# Patient Record
Sex: FEMALE | Race: BLACK OR AFRICAN AMERICAN | NOT HISPANIC OR LATINO | Employment: FULL TIME | URBAN - METROPOLITAN AREA
[De-identification: names, ages, dates, MRNs, and addresses within clinical notes are randomized per-mention and may not be internally consistent; named-entity substitution may affect disease eponyms.]

---

## 2017-01-25 LAB
CFTR MUT ANL BLD/T: NORMAL
EXTERNAL ABO GROUPING: NORMAL
EXTERNAL ANTIBODY SCREEN: NORMAL
EXTERNAL HEMATOCRIT: 28.6 %
EXTERNAL HEMOGLOBIN: 9.2 G/DL
EXTERNAL HEPATITIS B SURFACE ANTIGEN: NORMAL
EXTERNAL HIV-1 ANTIBODY: NORMAL
EXTERNAL RH FACTOR: POSITIVE
EXTERNAL RUBELLA IGG QUANTITATION: NORMAL
EXTERNAL SYPHILIS RPR SCREEN: NORMAL

## 2017-04-05 LAB
EXTERNAL CHLAMYDIA SCREEN: NORMAL
EXTERNAL GONORRHEA SCREEN: NORMAL
EXTERNAL HEMATOCRIT: 23.9 %
EXTERNAL HEMOGLOBIN: 7.8 G/DL
EXTERNAL PLATELET COUNT: 10 K/ΜL

## 2017-06-07 ENCOUNTER — GENERIC CONVERSION - ENCOUNTER (OUTPATIENT)
Dept: OTHER | Facility: OTHER | Age: 27
End: 2017-06-07

## 2017-06-07 ENCOUNTER — HOSPITAL ENCOUNTER (OUTPATIENT)
Facility: HOSPITAL | Age: 27
Discharge: HOME | End: 2017-06-07
Attending: OBSTETRICS & GYNECOLOGY | Admitting: OBSTETRICS & GYNECOLOGY
Payer: COMMERCIAL

## 2017-06-07 VITALS
DIASTOLIC BLOOD PRESSURE: 71 MMHG | RESPIRATION RATE: 18 BRPM | TEMPERATURE: 98 F | HEART RATE: 87 BPM | OXYGEN SATURATION: 100 % | SYSTOLIC BLOOD PRESSURE: 119 MMHG

## 2017-06-07 DIAGNOSIS — Z3A.36 36 WEEKS GESTATION OF PREGNANCY: ICD-10-CM

## 2017-06-07 PROBLEM — O28.1 ABNORMAL BIOCHEMICAL FINDING ON ANTENATAL SCREENING OF MOTHER: Status: ACTIVE | Noted: 2017-04-12

## 2017-06-07 PROBLEM — O09.30 LATE PRENATAL CARE AFFECTING PREGNANCY: Status: ACTIVE | Noted: 2017-02-13

## 2017-06-07 PROCEDURE — 99214 OFFICE O/P EST MOD 30 MIN: CPT

## 2017-06-13 ENCOUNTER — GENERIC CONVERSION - ENCOUNTER (OUTPATIENT)
Dept: OTHER | Facility: OTHER | Age: 27
End: 2017-06-13

## 2017-06-13 DIAGNOSIS — Z34.83 ENCOUNTER FOR SUPERVISION OF OTHER NORMAL PREGNANCY, THIRD TRIMESTER: ICD-10-CM

## 2017-06-13 DIAGNOSIS — O99.013 ANEMIA COMPLICATING PREGNANCY IN THIRD TRIMESTER: ICD-10-CM

## 2017-06-14 ENCOUNTER — APPOINTMENT (OUTPATIENT)
Dept: LAB | Facility: HOSPITAL | Age: 27
End: 2017-06-14
Payer: COMMERCIAL

## 2017-06-14 DIAGNOSIS — O99.013 ANEMIA COMPLICATING PREGNANCY IN THIRD TRIMESTER: ICD-10-CM

## 2017-06-14 DIAGNOSIS — Z34.83 ENCOUNTER FOR SUPERVISION OF OTHER NORMAL PREGNANCY, THIRD TRIMESTER: ICD-10-CM

## 2017-06-14 LAB
CHLAMYDIA DNA CVX QL NAA+PROBE: NORMAL
N GONORRHOEA DNA GENITAL QL NAA+PROBE: NORMAL

## 2017-06-14 PROCEDURE — 87591 N.GONORRHOEAE DNA AMP PROB: CPT

## 2017-06-14 PROCEDURE — 87491 CHLMYD TRACH DNA AMP PROBE: CPT

## 2017-06-14 PROCEDURE — 87653 STREP B DNA AMP PROBE: CPT

## 2017-06-14 PROCEDURE — 36415 COLL VENOUS BLD VENIPUNCTURE: CPT

## 2017-06-16 ENCOUNTER — ANESTHESIA (INPATIENT)
Dept: LABOR AND DELIVERY | Facility: HOSPITAL | Age: 27
DRG: 560 | End: 2017-06-16
Payer: COMMERCIAL

## 2017-06-16 ENCOUNTER — HOSPITAL ENCOUNTER (INPATIENT)
Facility: HOSPITAL | Age: 27
LOS: 2 days | Discharge: HOME | DRG: 560 | End: 2017-06-18
Attending: OBSTETRICS & GYNECOLOGY | Admitting: OBSTETRICS & GYNECOLOGY
Payer: COMMERCIAL

## 2017-06-16 DIAGNOSIS — Z3A.38 38 WEEKS GESTATION OF PREGNANCY: Primary | ICD-10-CM

## 2017-06-16 LAB
ABO GROUP BLD: NORMAL
BASOPHILS # BLD AUTO: 0 THOUSANDS/ΜL (ref 0–0.1)
BASOPHILS NFR BLD AUTO: 0 % (ref 0–1)
BLD GP AB SCN SERPL QL: NEGATIVE
EOSINOPHIL # BLD AUTO: 0.03 THOUSAND/ΜL (ref 0–0.61)
EOSINOPHIL NFR BLD AUTO: 0 % (ref 0–6)
ERYTHROCYTE [DISTWIDTH] IN BLOOD BY AUTOMATED COUNT: 16.2 % (ref 11.6–15.1)
GP B STREP DNA SPEC QL NAA+PROBE: NORMAL
HCT VFR BLD AUTO: 26.4 % (ref 34.8–46.1)
HGB BLD-MCNC: 8.1 G/DL (ref 11.5–15.4)
LYMPHOCYTES # BLD AUTO: 1.46 THOUSANDS/ΜL (ref 0.6–4.47)
LYMPHOCYTES NFR BLD AUTO: 17 % (ref 14–44)
MCH RBC QN AUTO: 25.4 PG (ref 26.8–34.3)
MCHC RBC AUTO-ENTMCNC: 30.7 G/DL (ref 31.4–37.4)
MCV RBC AUTO: 83 FL (ref 82–98)
MONOCYTES # BLD AUTO: 0.52 THOUSAND/ΜL (ref 0.17–1.22)
MONOCYTES NFR BLD AUTO: 6 % (ref 4–12)
NEUTROPHILS # BLD AUTO: 6.8 THOUSANDS/ΜL (ref 1.85–7.62)
NEUTS SEG NFR BLD AUTO: 77 % (ref 43–75)
NRBC BLD AUTO-RTO: 0 /100 WBCS
PLATELET # BLD AUTO: 322 THOUSANDS/UL (ref 149–390)
PMV BLD AUTO: 8.8 FL (ref 8.9–12.7)
RBC # BLD AUTO: 3.19 MILLION/UL (ref 3.81–5.12)
RH BLD: POSITIVE
SPECIMEN EXPIRATION DATE: NORMAL
WBC # BLD AUTO: 8.83 THOUSAND/UL (ref 4.31–10.16)

## 2017-06-16 PROCEDURE — 86850 RBC ANTIBODY SCREEN: CPT | Performed by: OBSTETRICS & GYNECOLOGY

## 2017-06-16 PROCEDURE — 86592 SYPHILIS TEST NON-TREP QUAL: CPT | Performed by: OBSTETRICS & GYNECOLOGY

## 2017-06-16 PROCEDURE — 86901 BLOOD TYPING SEROLOGIC RH(D): CPT | Performed by: OBSTETRICS & GYNECOLOGY

## 2017-06-16 PROCEDURE — 99214 OFFICE O/P EST MOD 30 MIN: CPT

## 2017-06-16 PROCEDURE — 85025 COMPLETE CBC W/AUTO DIFF WBC: CPT | Performed by: OBSTETRICS & GYNECOLOGY

## 2017-06-16 PROCEDURE — 86900 BLOOD TYPING SEROLOGIC ABO: CPT | Performed by: OBSTETRICS & GYNECOLOGY

## 2017-06-16 RX ORDER — SIMETHICONE 80 MG
80 TABLET,CHEWABLE ORAL 4 TIMES DAILY PRN
Status: DISCONTINUED | OUTPATIENT
Start: 2017-06-16 | End: 2017-06-18 | Stop reason: HOSPADM

## 2017-06-16 RX ORDER — IBUPROFEN 600 MG/1
600 TABLET ORAL EVERY 6 HOURS PRN
Status: DISCONTINUED | OUTPATIENT
Start: 2017-06-16 | End: 2017-06-18 | Stop reason: HOSPADM

## 2017-06-16 RX ORDER — DIAPER,BRIEF,INFANT-TODD,DISP
1 EACH MISCELLANEOUS AS NEEDED
Status: DISCONTINUED | OUTPATIENT
Start: 2017-06-16 | End: 2017-06-18 | Stop reason: HOSPADM

## 2017-06-16 RX ORDER — DOCUSATE SODIUM 100 MG/1
100 CAPSULE, LIQUID FILLED ORAL 2 TIMES DAILY
Status: DISCONTINUED | OUTPATIENT
Start: 2017-06-16 | End: 2017-06-18 | Stop reason: HOSPADM

## 2017-06-16 RX ORDER — OXYCODONE HYDROCHLORIDE AND ACETAMINOPHEN 5; 325 MG/1; MG/1
2 TABLET ORAL EVERY 4 HOURS PRN
Status: DISCONTINUED | OUTPATIENT
Start: 2017-06-16 | End: 2017-06-18 | Stop reason: HOSPADM

## 2017-06-16 RX ORDER — ACETAMINOPHEN 325 MG/1
650 TABLET ORAL EVERY 6 HOURS PRN
Status: DISCONTINUED | OUTPATIENT
Start: 2017-06-16 | End: 2017-06-18 | Stop reason: HOSPADM

## 2017-06-16 RX ORDER — OXYTOCIN/RINGER'S LACTATE 30/500 ML
62.5 PLASTIC BAG, INJECTION (ML) INTRAVENOUS CONTINUOUS
Status: DISPENSED | OUTPATIENT
Start: 2017-06-16 | End: 2017-06-16

## 2017-06-16 RX ORDER — BISACODYL 10 MG
10 SUPPOSITORY, RECTAL RECTAL AS NEEDED
Status: DISCONTINUED | OUTPATIENT
Start: 2017-06-16 | End: 2017-06-18 | Stop reason: HOSPADM

## 2017-06-16 RX ORDER — METOCLOPRAMIDE HYDROCHLORIDE 5 MG/ML
10 INJECTION INTRAMUSCULAR; INTRAVENOUS EVERY 6 HOURS PRN
Status: DISCONTINUED | OUTPATIENT
Start: 2017-06-16 | End: 2017-06-18 | Stop reason: HOSPADM

## 2017-06-16 RX ORDER — SODIUM CHLORIDE, SODIUM LACTATE, POTASSIUM CHLORIDE, CALCIUM CHLORIDE 600; 310; 30; 20 MG/100ML; MG/100ML; MG/100ML; MG/100ML
125 INJECTION, SOLUTION INTRAVENOUS CONTINUOUS
Status: DISCONTINUED | OUTPATIENT
Start: 2017-06-16 | End: 2017-06-18 | Stop reason: HOSPADM

## 2017-06-16 RX ORDER — DIPHENHYDRAMINE HCL 25 MG
25 TABLET ORAL EVERY 6 HOURS PRN
Status: DISCONTINUED | OUTPATIENT
Start: 2017-06-16 | End: 2017-06-18 | Stop reason: HOSPADM

## 2017-06-16 RX ORDER — LIDOCAINE HYDROCHLORIDE AND EPINEPHRINE 15; 5 MG/ML; UG/ML
INJECTION, SOLUTION EPIDURAL AS NEEDED
Status: DISCONTINUED | OUTPATIENT
Start: 2017-06-16 | End: 2017-06-16 | Stop reason: SURG

## 2017-06-16 RX ORDER — OXYCODONE HYDROCHLORIDE AND ACETAMINOPHEN 5; 325 MG/1; MG/1
1 TABLET ORAL EVERY 4 HOURS PRN
Status: DISCONTINUED | OUTPATIENT
Start: 2017-06-16 | End: 2017-06-18 | Stop reason: HOSPADM

## 2017-06-16 RX ORDER — ROPIVACAINE HYDROCHLORIDE 2 MG/ML
INJECTION, SOLUTION EPIDURAL; INFILTRATION; PERINEURAL AS NEEDED
Status: DISCONTINUED | OUTPATIENT
Start: 2017-06-16 | End: 2017-06-16 | Stop reason: SURG

## 2017-06-16 RX ORDER — OXYTOCIN/RINGER'S LACTATE 30/500 ML
PLASTIC BAG, INJECTION (ML) INTRAVENOUS
Status: DISPENSED
Start: 2017-06-16 | End: 2017-06-16

## 2017-06-16 RX ADMIN — DOCUSATE SODIUM 100 MG: 100 CAPSULE, LIQUID FILLED ORAL at 17:48

## 2017-06-16 RX ADMIN — SODIUM CHLORIDE, SODIUM LACTATE, POTASSIUM CHLORIDE, AND CALCIUM CHLORIDE 62.5 ML/HR: .6; .31; .03; .02 INJECTION, SOLUTION INTRAVENOUS at 12:28

## 2017-06-16 RX ADMIN — OXYCODONE HYDROCHLORIDE AND ACETAMINOPHEN 2 TABLET: 5; 325 TABLET ORAL at 16:21

## 2017-06-16 RX ADMIN — Medication 62.5 MILLI-UNITS/MIN: at 12:25

## 2017-06-16 RX ADMIN — LIDOCAINE HYDROCHLORIDE AND EPINEPHRINE 5 ML: 15; 5 INJECTION, SOLUTION EPIDURAL at 11:10

## 2017-06-16 RX ADMIN — IBUPROFEN 600 MG: 600 TABLET, FILM COATED ORAL at 13:16

## 2017-06-16 RX ADMIN — ROPIVACAINE HYDROCHLORIDE 7 ML: 2 INJECTION, SOLUTION EPIDURAL; INFILTRATION at 11:10

## 2017-06-16 RX ADMIN — SODIUM CHLORIDE, SODIUM LACTATE, POTASSIUM CHLORIDE, AND CALCIUM CHLORIDE 125 ML/HR: .6; .31; .03; .02 INJECTION, SOLUTION INTRAVENOUS at 10:26

## 2017-06-17 LAB
ANISOCYTOSIS BLD QL SMEAR: PRESENT
BASOPHILS # BLD MANUAL: 0 THOUSAND/UL (ref 0–0.1)
BASOPHILS NFR MAR MANUAL: 0 % (ref 0–1)
BURR CELLS BLD QL SMEAR: PRESENT
EOSINOPHIL # BLD MANUAL: 0 THOUSAND/UL (ref 0–0.4)
EOSINOPHIL NFR BLD MANUAL: 0 % (ref 0–6)
ERYTHROCYTE [DISTWIDTH] IN BLOOD BY AUTOMATED COUNT: 16.5 % (ref 11.6–15.1)
HCT VFR BLD AUTO: 24.3 % (ref 34.8–46.1)
HGB BLD-MCNC: 7.5 G/DL (ref 11.5–15.4)
LYMPHOCYTES # BLD AUTO: 1.27 THOUSAND/UL (ref 0.6–4.47)
LYMPHOCYTES # BLD AUTO: 12 % (ref 14–44)
MCH RBC QN AUTO: 25.8 PG (ref 26.8–34.3)
MCHC RBC AUTO-ENTMCNC: 30.9 G/DL (ref 31.4–37.4)
MCV RBC AUTO: 84 FL (ref 82–98)
METAMYELOCYTES NFR BLD MANUAL: 1 % (ref 0–1)
MONOCYTES # BLD AUTO: 0.42 THOUSAND/UL (ref 0–1.22)
MONOCYTES NFR BLD: 4 % (ref 4–12)
NEUTROPHILS # BLD MANUAL: 8.58 THOUSAND/UL (ref 1.85–7.62)
NEUTS SEG NFR BLD AUTO: 81 % (ref 43–75)
NRBC BLD AUTO-RTO: 1 /100 WBCS
PLATELET # BLD AUTO: 333 THOUSANDS/UL (ref 149–390)
PLATELET BLD QL SMEAR: ADEQUATE
PMV BLD AUTO: 9.3 FL (ref 8.9–12.7)
POIKILOCYTOSIS BLD QL SMEAR: PRESENT
RBC # BLD AUTO: 2.91 MILLION/UL (ref 3.81–5.12)
RBC MORPH BLD: PRESENT
VARIANT LYMPHS # BLD AUTO: 2 %
WBC # BLD AUTO: 10.59 THOUSAND/UL (ref 4.31–10.16)

## 2017-06-17 PROCEDURE — 85007 BL SMEAR W/DIFF WBC COUNT: CPT | Performed by: OBSTETRICS & GYNECOLOGY

## 2017-06-17 PROCEDURE — 85027 COMPLETE CBC AUTOMATED: CPT | Performed by: OBSTETRICS & GYNECOLOGY

## 2017-06-17 RX ORDER — MEDROXYPROGESTERONE ACETATE 150 MG/ML
150 INJECTION, SUSPENSION INTRAMUSCULAR ONCE
Status: COMPLETED | OUTPATIENT
Start: 2017-06-17 | End: 2017-06-18

## 2017-06-17 RX ADMIN — IBUPROFEN 600 MG: 600 TABLET, FILM COATED ORAL at 08:35

## 2017-06-17 RX ADMIN — IBUPROFEN 600 MG: 600 TABLET, FILM COATED ORAL at 01:01

## 2017-06-17 RX ADMIN — OXYCODONE HYDROCHLORIDE AND ACETAMINOPHEN 1 TABLET: 5; 325 TABLET ORAL at 20:42

## 2017-06-17 RX ADMIN — OXYCODONE HYDROCHLORIDE AND ACETAMINOPHEN 1 TABLET: 5; 325 TABLET ORAL at 03:16

## 2017-06-17 RX ADMIN — IBUPROFEN 600 MG: 600 TABLET, FILM COATED ORAL at 15:24

## 2017-06-17 RX ADMIN — DOCUSATE SODIUM 100 MG: 100 CAPSULE, LIQUID FILLED ORAL at 20:42

## 2017-06-17 RX ADMIN — DOCUSATE SODIUM 100 MG: 100 CAPSULE, LIQUID FILLED ORAL at 08:35

## 2017-06-18 VITALS
RESPIRATION RATE: 20 BRPM | HEIGHT: 67 IN | SYSTOLIC BLOOD PRESSURE: 118 MMHG | BODY MASS INDEX: 35.31 KG/M2 | DIASTOLIC BLOOD PRESSURE: 62 MMHG | OXYGEN SATURATION: 98 % | WEIGHT: 225 LBS | HEART RATE: 73 BPM | TEMPERATURE: 97.9 F

## 2017-06-18 RX ADMIN — DOCUSATE SODIUM 100 MG: 100 CAPSULE, LIQUID FILLED ORAL at 08:12

## 2017-06-18 RX ADMIN — MEDROXYPROGESTERONE ACETATE 150 MG: 150 INJECTION, SUSPENSION INTRAMUSCULAR at 11:02

## 2017-06-18 RX ADMIN — OXYCODONE HYDROCHLORIDE AND ACETAMINOPHEN 1 TABLET: 5; 325 TABLET ORAL at 06:27

## 2017-06-19 ENCOUNTER — GENERIC CONVERSION - ENCOUNTER (OUTPATIENT)
Dept: OTHER | Facility: OTHER | Age: 27
End: 2017-06-19

## 2017-06-19 LAB — RPR SER QL: NORMAL

## 2017-06-26 LAB — PLACENTA IN STORAGE: NORMAL

## 2017-07-05 ENCOUNTER — ALLSCRIPTS OFFICE VISIT (OUTPATIENT)
Dept: OTHER | Facility: OTHER | Age: 27
End: 2017-07-05

## 2017-07-11 ENCOUNTER — GENERIC CONVERSION - ENCOUNTER (OUTPATIENT)
Dept: OTHER | Facility: OTHER | Age: 27
End: 2017-07-11

## 2017-08-15 ENCOUNTER — ALLSCRIPTS OFFICE VISIT (OUTPATIENT)
Dept: OTHER | Facility: OTHER | Age: 27
End: 2017-08-15

## 2017-08-15 ENCOUNTER — GENERIC CONVERSION - ENCOUNTER (OUTPATIENT)
Dept: OTHER | Facility: OTHER | Age: 27
End: 2017-08-15

## 2017-08-15 DIAGNOSIS — Z01.818 ENCOUNTER FOR OTHER PREPROCEDURAL EXAMINATION: ICD-10-CM

## 2017-08-30 ENCOUNTER — TRANSCRIBE ORDERS (OUTPATIENT)
Dept: LAB | Facility: CLINIC | Age: 27
End: 2017-08-30

## 2017-08-30 ENCOUNTER — APPOINTMENT (OUTPATIENT)
Dept: LAB | Facility: CLINIC | Age: 27
End: 2017-08-30
Payer: COMMERCIAL

## 2017-08-30 DIAGNOSIS — Z01.818 ENCOUNTER FOR OTHER PREPROCEDURAL EXAMINATION: ICD-10-CM

## 2017-08-30 DIAGNOSIS — Z01.818 PREOP EXAMINATION: Primary | ICD-10-CM

## 2017-08-30 LAB
BACTERIA UR QL AUTO: ABNORMAL /HPF
BASOPHILS # BLD AUTO: 0.01 THOUSANDS/ΜL (ref 0–0.1)
BASOPHILS NFR BLD AUTO: 0 % (ref 0–1)
BILIRUB UR QL STRIP: NEGATIVE
CLARITY UR: CLEAR
COLOR UR: YELLOW
EOSINOPHIL # BLD AUTO: 0.13 THOUSAND/ΜL (ref 0–0.61)
EOSINOPHIL NFR BLD AUTO: 1 % (ref 0–6)
ERYTHROCYTE [DISTWIDTH] IN BLOOD BY AUTOMATED COUNT: 15.9 % (ref 11.6–15.1)
GLUCOSE UR STRIP-MCNC: NEGATIVE MG/DL
HCT VFR BLD AUTO: 31.6 % (ref 34.8–46.1)
HGB BLD-MCNC: 9.3 G/DL (ref 11.5–15.4)
HGB UR QL STRIP.AUTO: ABNORMAL
HYALINE CASTS #/AREA URNS LPF: ABNORMAL /LPF
KETONES UR STRIP-MCNC: NEGATIVE MG/DL
LEUKOCYTE ESTERASE UR QL STRIP: NEGATIVE
LYMPHOCYTES # BLD AUTO: 2.74 THOUSANDS/ΜL (ref 0.6–4.47)
LYMPHOCYTES NFR BLD AUTO: 29 % (ref 14–44)
MCH RBC QN AUTO: 25.1 PG (ref 26.8–34.3)
MCHC RBC AUTO-ENTMCNC: 29.4 G/DL (ref 31.4–37.4)
MCV RBC AUTO: 85 FL (ref 82–98)
MONOCYTES # BLD AUTO: 0.35 THOUSAND/ΜL (ref 0.17–1.22)
MONOCYTES NFR BLD AUTO: 4 % (ref 4–12)
NEUTROPHILS # BLD AUTO: 6.16 THOUSANDS/ΜL (ref 1.85–7.62)
NEUTS SEG NFR BLD AUTO: 66 % (ref 43–75)
NITRITE UR QL STRIP: NEGATIVE
NON-SQ EPI CELLS URNS QL MICRO: ABNORMAL /HPF
NRBC BLD AUTO-RTO: 0 /100 WBCS
PH UR STRIP.AUTO: 7 [PH] (ref 4.5–8)
PLATELET # BLD AUTO: 430 THOUSANDS/UL (ref 149–390)
PMV BLD AUTO: 9 FL (ref 8.9–12.7)
PROT UR STRIP-MCNC: NEGATIVE MG/DL
RBC # BLD AUTO: 3.7 MILLION/UL (ref 3.81–5.12)
RBC #/AREA URNS AUTO: ABNORMAL /HPF
SP GR UR STRIP.AUTO: 1.02 (ref 1–1.03)
UROBILINOGEN UR QL STRIP.AUTO: 0.2 E.U./DL
WBC # BLD AUTO: 9.42 THOUSAND/UL (ref 4.31–10.16)
WBC #/AREA URNS AUTO: ABNORMAL /HPF

## 2017-08-30 PROCEDURE — 85025 COMPLETE CBC W/AUTO DIFF WBC: CPT

## 2017-08-30 PROCEDURE — 81001 URINALYSIS AUTO W/SCOPE: CPT

## 2017-08-30 PROCEDURE — 86900 BLOOD TYPING SEROLOGIC ABO: CPT | Performed by: OBSTETRICS & GYNECOLOGY

## 2017-08-30 PROCEDURE — 36415 COLL VENOUS BLD VENIPUNCTURE: CPT

## 2017-08-30 PROCEDURE — 86850 RBC ANTIBODY SCREEN: CPT | Performed by: OBSTETRICS & GYNECOLOGY

## 2017-08-30 PROCEDURE — 86901 BLOOD TYPING SEROLOGIC RH(D): CPT | Performed by: OBSTETRICS & GYNECOLOGY

## 2017-08-31 ENCOUNTER — LAB REQUISITION (OUTPATIENT)
Dept: LAB | Facility: HOSPITAL | Age: 27
End: 2017-08-31
Payer: COMMERCIAL

## 2017-08-31 DIAGNOSIS — Z01.818 ENCOUNTER FOR OTHER PREPROCEDURAL EXAMINATION: ICD-10-CM

## 2017-09-04 ENCOUNTER — ANESTHESIA EVENT (OUTPATIENT)
Dept: PERIOP | Facility: HOSPITAL | Age: 27
End: 2017-09-04
Payer: COMMERCIAL

## 2017-09-04 LAB
ABO GROUP BLD: NORMAL
BLD GP AB SCN SERPL QL: NEGATIVE
RH BLD: POSITIVE
SPECIMEN EXPIRATION DATE: NORMAL

## 2017-09-05 ENCOUNTER — ANESTHESIA (OUTPATIENT)
Dept: PERIOP | Facility: HOSPITAL | Age: 27
End: 2017-09-05
Payer: COMMERCIAL

## 2017-09-05 ENCOUNTER — HOSPITAL ENCOUNTER (OUTPATIENT)
Facility: HOSPITAL | Age: 27
Setting detail: OUTPATIENT SURGERY
Discharge: HOME | End: 2017-09-05
Attending: OBSTETRICS & GYNECOLOGY | Admitting: OBSTETRICS & GYNECOLOGY
Payer: COMMERCIAL

## 2017-09-05 VITALS
TEMPERATURE: 98.9 F | OXYGEN SATURATION: 99 % | WEIGHT: 220 LBS | SYSTOLIC BLOOD PRESSURE: 117 MMHG | RESPIRATION RATE: 16 BRPM | BODY MASS INDEX: 34.53 KG/M2 | HEART RATE: 70 BPM | HEIGHT: 67 IN | DIASTOLIC BLOOD PRESSURE: 57 MMHG

## 2017-09-05 DIAGNOSIS — Z64.1 PROBLEMS RELATED TO MULTIPARITY: ICD-10-CM

## 2017-09-05 LAB
ABO GROUP BLD: NORMAL
ANISOCYTOSIS BLD QL SMEAR: PRESENT
BASOPHILS # BLD MANUAL: 0 THOUSAND/UL (ref 0–0.1)
BASOPHILS NFR MAR MANUAL: 0 % (ref 0–1)
BLD GP AB SCN SERPL QL: NEGATIVE
BURR CELLS BLD QL SMEAR: PRESENT
EOSINOPHIL # BLD MANUAL: 0.18 THOUSAND/UL (ref 0–0.4)
EOSINOPHIL NFR BLD MANUAL: 2 % (ref 0–6)
ERYTHROCYTE [DISTWIDTH] IN BLOOD BY AUTOMATED COUNT: 15.8 % (ref 11.6–15.1)
EXT PREGNANCY TEST URINE: NEGATIVE
HCT VFR BLD AUTO: 30 % (ref 34.8–46.1)
HGB BLD-MCNC: 9.1 G/DL (ref 11.5–15.4)
LYMPHOCYTES # BLD AUTO: 2.91 THOUSAND/UL (ref 0.6–4.47)
LYMPHOCYTES # BLD AUTO: 33 % (ref 14–44)
MCH RBC QN AUTO: 25.3 PG (ref 26.8–34.3)
MCHC RBC AUTO-ENTMCNC: 30.3 G/DL (ref 31.4–37.4)
MCV RBC AUTO: 84 FL (ref 82–98)
MONOCYTES # BLD AUTO: 0.88 THOUSAND/UL (ref 0–1.22)
MONOCYTES NFR BLD: 10 % (ref 4–12)
NEUTROPHILS # BLD MANUAL: 4.85 THOUSAND/UL (ref 1.85–7.62)
NEUTS SEG NFR BLD AUTO: 55 % (ref 43–75)
NRBC BLD AUTO-RTO: 1 /100 WBCS
PLATELET # BLD AUTO: 397 THOUSANDS/UL (ref 149–390)
PLATELET BLD QL SMEAR: ADEQUATE
PMV BLD AUTO: 8.9 FL (ref 8.9–12.7)
POIKILOCYTOSIS BLD QL SMEAR: PRESENT
RBC # BLD AUTO: 3.59 MILLION/UL (ref 3.81–5.12)
RBC MORPH BLD: PRESENT
RH BLD: POSITIVE
SPECIMEN EXPIRATION DATE: NORMAL
WBC # BLD AUTO: 8.81 THOUSAND/UL (ref 4.31–10.16)
WBC TOXIC VACUOLES BLD QL SMEAR: PRESENT

## 2017-09-05 PROCEDURE — 88302 TISSUE EXAM BY PATHOLOGIST: CPT | Performed by: OBSTETRICS & GYNECOLOGY

## 2017-09-05 PROCEDURE — 81025 URINE PREGNANCY TEST: CPT | Performed by: OBSTETRICS & GYNECOLOGY

## 2017-09-05 PROCEDURE — 85007 BL SMEAR W/DIFF WBC COUNT: CPT | Performed by: OBSTETRICS & GYNECOLOGY

## 2017-09-05 PROCEDURE — 86900 BLOOD TYPING SEROLOGIC ABO: CPT | Performed by: OBSTETRICS & GYNECOLOGY

## 2017-09-05 PROCEDURE — 85027 COMPLETE CBC AUTOMATED: CPT | Performed by: OBSTETRICS & GYNECOLOGY

## 2017-09-05 PROCEDURE — 86850 RBC ANTIBODY SCREEN: CPT | Performed by: OBSTETRICS & GYNECOLOGY

## 2017-09-05 PROCEDURE — 86901 BLOOD TYPING SEROLOGIC RH(D): CPT | Performed by: OBSTETRICS & GYNECOLOGY

## 2017-09-05 RX ORDER — OXYCODONE HYDROCHLORIDE AND ACETAMINOPHEN 5; 325 MG/1; MG/1
1 TABLET ORAL EVERY 4 HOURS PRN
Status: DISCONTINUED | OUTPATIENT
Start: 2017-09-05 | End: 2017-09-05 | Stop reason: HOSPADM

## 2017-09-05 RX ORDER — SODIUM CHLORIDE, SODIUM LACTATE, POTASSIUM CHLORIDE, CALCIUM CHLORIDE 600; 310; 30; 20 MG/100ML; MG/100ML; MG/100ML; MG/100ML
75 INJECTION, SOLUTION INTRAVENOUS CONTINUOUS
Status: DISCONTINUED | OUTPATIENT
Start: 2017-09-05 | End: 2017-09-05 | Stop reason: HOSPADM

## 2017-09-05 RX ORDER — IBUPROFEN 600 MG/1
600 TABLET ORAL EVERY 6 HOURS PRN
Qty: 30 TABLET | Refills: 0 | Status: SHIPPED | OUTPATIENT
Start: 2017-09-05 | End: 2018-01-17

## 2017-09-05 RX ORDER — ROCURONIUM BROMIDE 10 MG/ML
INJECTION, SOLUTION INTRAVENOUS AS NEEDED
Status: DISCONTINUED | OUTPATIENT
Start: 2017-09-05 | End: 2017-09-05 | Stop reason: SURG

## 2017-09-05 RX ORDER — KETOROLAC TROMETHAMINE 30 MG/ML
INJECTION, SOLUTION INTRAMUSCULAR; INTRAVENOUS AS NEEDED
Status: DISCONTINUED | OUTPATIENT
Start: 2017-09-05 | End: 2017-09-05 | Stop reason: SURG

## 2017-09-05 RX ORDER — PROPOFOL 10 MG/ML
INJECTION, EMULSION INTRAVENOUS AS NEEDED
Status: DISCONTINUED | OUTPATIENT
Start: 2017-09-05 | End: 2017-09-05 | Stop reason: SURG

## 2017-09-05 RX ORDER — SODIUM CHLORIDE, SODIUM LACTATE, POTASSIUM CHLORIDE, CALCIUM CHLORIDE 600; 310; 30; 20 MG/100ML; MG/100ML; MG/100ML; MG/100ML
INJECTION, SOLUTION INTRAVENOUS CONTINUOUS PRN
Status: DISCONTINUED | OUTPATIENT
Start: 2017-09-05 | End: 2017-09-05 | Stop reason: SURG

## 2017-09-05 RX ORDER — OXYCODONE HYDROCHLORIDE AND ACETAMINOPHEN 5; 325 MG/1; MG/1
2 TABLET ORAL EVERY 4 HOURS PRN
Status: DISCONTINUED | OUTPATIENT
Start: 2017-09-05 | End: 2017-09-05 | Stop reason: HOSPADM

## 2017-09-05 RX ORDER — PROMETHAZINE HYDROCHLORIDE 25 MG/ML
12.5 INJECTION, SOLUTION INTRAMUSCULAR; INTRAVENOUS ONCE AS NEEDED
Status: DISCONTINUED | OUTPATIENT
Start: 2017-09-05 | End: 2017-09-05 | Stop reason: HOSPADM

## 2017-09-05 RX ORDER — ACETAMINOPHEN 325 MG/1
650 TABLET ORAL EVERY 6 HOURS PRN
COMMUNITY
End: 2018-03-16 | Stop reason: ALTCHOICE

## 2017-09-05 RX ORDER — FENTANYL CITRATE/PF 50 MCG/ML
50 SYRINGE (ML) INJECTION
Status: DISCONTINUED | OUTPATIENT
Start: 2017-09-05 | End: 2017-09-05 | Stop reason: HOSPADM

## 2017-09-05 RX ORDER — ONDANSETRON 2 MG/ML
INJECTION INTRAMUSCULAR; INTRAVENOUS AS NEEDED
Status: DISCONTINUED | OUTPATIENT
Start: 2017-09-05 | End: 2017-09-05 | Stop reason: SURG

## 2017-09-05 RX ORDER — MIDAZOLAM HYDROCHLORIDE 1 MG/ML
INJECTION INTRAMUSCULAR; INTRAVENOUS AS NEEDED
Status: DISCONTINUED | OUTPATIENT
Start: 2017-09-05 | End: 2017-09-05 | Stop reason: SURG

## 2017-09-05 RX ORDER — BUPIVACAINE HYDROCHLORIDE 2.5 MG/ML
INJECTION, SOLUTION INFILTRATION; PERINEURAL AS NEEDED
Status: DISCONTINUED | OUTPATIENT
Start: 2017-09-05 | End: 2017-09-05 | Stop reason: HOSPADM

## 2017-09-05 RX ORDER — ONDANSETRON 2 MG/ML
4 INJECTION INTRAMUSCULAR; INTRAVENOUS EVERY 6 HOURS PRN
Status: DISCONTINUED | OUTPATIENT
Start: 2017-09-05 | End: 2017-09-05 | Stop reason: HOSPADM

## 2017-09-05 RX ORDER — ACETAMINOPHEN 325 MG/1
650 TABLET ORAL EVERY 6 HOURS PRN
Status: DISCONTINUED | OUTPATIENT
Start: 2017-09-05 | End: 2017-09-05 | Stop reason: HOSPADM

## 2017-09-05 RX ORDER — FENTANYL CITRATE 50 UG/ML
INJECTION, SOLUTION INTRAMUSCULAR; INTRAVENOUS AS NEEDED
Status: DISCONTINUED | OUTPATIENT
Start: 2017-09-05 | End: 2017-09-05 | Stop reason: SURG

## 2017-09-05 RX ORDER — IBUPROFEN 400 MG/1
600 TABLET ORAL EVERY 6 HOURS PRN
Status: DISCONTINUED | OUTPATIENT
Start: 2017-09-05 | End: 2017-09-05 | Stop reason: HOSPADM

## 2017-09-05 RX ORDER — GLYCOPYRROLATE 0.2 MG/ML
INJECTION INTRAMUSCULAR; INTRAVENOUS AS NEEDED
Status: DISCONTINUED | OUTPATIENT
Start: 2017-09-05 | End: 2017-09-05 | Stop reason: SURG

## 2017-09-05 RX ADMIN — DEXAMETHASONE SODIUM PHOSPHATE 10 MG: 10 INJECTION INTRAMUSCULAR; INTRAVENOUS at 09:05

## 2017-09-05 RX ADMIN — HYDROMORPHONE HYDROCHLORIDE 0.5 MG: 1 INJECTION, SOLUTION INTRAMUSCULAR; INTRAVENOUS; SUBCUTANEOUS at 10:11

## 2017-09-05 RX ADMIN — MIDAZOLAM HYDROCHLORIDE 2 MG: 1 INJECTION, SOLUTION INTRAMUSCULAR; INTRAVENOUS at 08:35

## 2017-09-05 RX ADMIN — PROPOFOL 200 MG: 10 INJECTION, EMULSION INTRAVENOUS at 08:44

## 2017-09-05 RX ADMIN — HYDROMORPHONE HYDROCHLORIDE 0.5 MG: 1 INJECTION, SOLUTION INTRAMUSCULAR; INTRAVENOUS; SUBCUTANEOUS at 09:00

## 2017-09-05 RX ADMIN — SODIUM CHLORIDE, SODIUM LACTATE, POTASSIUM CHLORIDE, AND CALCIUM CHLORIDE: .6; .31; .03; .02 INJECTION, SOLUTION INTRAVENOUS at 08:00

## 2017-09-05 RX ADMIN — GLYCOPYRROLATE 0.4 MG: 0.2 INJECTION, SOLUTION INTRAMUSCULAR; INTRAVENOUS at 10:01

## 2017-09-05 RX ADMIN — ONDANSETRON 4 MG: 2 INJECTION INTRAMUSCULAR; INTRAVENOUS at 09:49

## 2017-09-05 RX ADMIN — NEOSTIGMINE METHYLSULFATE 3 MG: 1 INJECTION, SOLUTION INTRAMUSCULAR; INTRAVENOUS; SUBCUTANEOUS at 10:01

## 2017-09-05 RX ADMIN — ROCURONIUM BROMIDE 30 MG: 10 INJECTION, SOLUTION INTRAVENOUS at 08:44

## 2017-09-05 RX ADMIN — ROCURONIUM BROMIDE 20 MG: 10 INJECTION, SOLUTION INTRAVENOUS at 09:18

## 2017-09-05 RX ADMIN — KETOROLAC TROMETHAMINE 30 MG: 30 INJECTION, SOLUTION INTRAMUSCULAR at 09:49

## 2017-09-05 RX ADMIN — FENTANYL CITRATE 100 MCG: 50 INJECTION, SOLUTION INTRAMUSCULAR; INTRAVENOUS at 08:53

## 2017-09-05 RX ADMIN — FENTANYL CITRATE 50 MCG: 50 INJECTION INTRAMUSCULAR; INTRAVENOUS at 10:25

## 2017-09-07 ENCOUNTER — GENERIC CONVERSION - ENCOUNTER (OUTPATIENT)
Dept: OTHER | Facility: OTHER | Age: 27
End: 2017-09-07

## 2018-01-09 NOTE — MISCELLANEOUS
Message  KIRILL call placed  Patient reports she is doing well and has no issues with pain or bleeding  Infant is bottle feeding and has appt  with pediatrician set up  Patient will call office to schedule her PP appointment  Active Problems    1  Bacterial vaginosis (616 10,041 9) (N76 0,B96 89)   2  Encounter for contraceptive management (V25 9) (Z30 9)   3  Encounter for IUD removal (V25 12) (Z30 432)   4  Encounter for routine gynecological examination (V72 31) (Z01 419)   5  Missed periods (626 4) (N92 6)   6  Nausea with vomiting (787 01) (R11 2)   7  Screening for STD (sexually transmitted disease) (V74 5) (Z11 3)   8  Trichomonal vulvovaginitis (131 01) (A59 01)   9  Urinary tract infection (599 0) (N39 0)   10  Yeast infection of the vagina (112 1) (B37 3)    Current Meds   1  Metoclopramide HCl - 10 MG Oral Tablet (Reglan); TAKE 1 TABLET EVERY 6 HOURS   AS NEEDED; Therapy: 36IKK7391 to (Evaluate:29Bpy6194)  Requested for: 02CQD2864; Last   Rx:55Hpm0435 Ordered   2  Prenatal One Daily 27-0 8 MG Oral Tablet; TAKE 1 TABLET DAILY AS DIRECTED; Therapy: 03MVI2311 to (Evaluate:81Tgw2713)  Requested for: 74TVI2565; Last   Rx:21Uyt7769 Ordered    Allergies    1   No Known Drug Allergies    Signatures   Electronically signed by : Selin Todd, ; Jun 19 2017  2:26PM EST                       (Author)

## 2018-01-10 NOTE — MISCELLANEOUS
Message  [place]  salpingectomy 9/5/17  Incision clean and dry  No issues with pain control  Follow-up appt  confirmed       Active Problems    1  Bacterial vaginosis (616 10,041 9) (N76 0,B96 89)   2  Consultation for sterilization (V25 09) (Z30 09)   3  Encounter for contraceptive management (V25 9) (Z30 9)   4  Encounter for IUD removal (V25 12) (Z30 432)   5  Encounter for routine gynecological examination (V72 31) (Z01 419)   6  Missed periods (626 4) (N92 6)   7  Postpartum exam (V24 2) (Z39 2)   8  Preoperative testing (V72 84) (Z01 818)   9  Screening for STD (sexually transmitted disease) (V74 5) (Z11 3)   10  Trichomonal vulvovaginitis (131 01) (A59 01)   11  Urinary tract infection (599 0) (N39 0)   12  Yeast infection of the vagina (112 1) (B37 3)    Current Meds   1  No Reported Medications Recorded    Allergies    1   No Known Drug Allergies    Signatures   Electronically signed by : Janett Ortiz RN; Sep  7 2017  2:18PM EST                       (Author)

## 2018-01-11 NOTE — RESULT NOTES
Message   Pt was treated for yeast at appt  Monitor sx for BV since equivocal and wet mt/jacquelyn was neg  for bv  Verified Results  *(Q) SURESWAB(R), VAGINOSIS/VAGINITIS PLUS [id number] 12:00AM Ko Cherry     Test Name Result Flag Reference   CHLAMYDIA TRACHOMATIS$RNA, TMA Not Detected  Not Detected   NEISSERIA [address], TMA Not Detected  Not Detected   This test was performed using the APTIMA COMBO2(R)  Assay (GEN-PROBE(R))  The analytical performance characteristics of this  assay, when used to test SurePath(R) specimens have  been determined by [employer]  BV CATEGORY: EQUIVOCAL A NOT SUPPORTIV   LACTOBACILLUS SPECIES 5 5 Log (cells/mL)     ATOPOBIUM VAGINAE 6 7 Log (cells/mL)     MEGASPHAERA SPECIES 6 8 Log (cells/mL)     GARDNERELLA VAGINALIS >8 0 Log (cells/mL)     NOT SUPPORTIVE OF BV: The pattern of results is not  supportive of a diagnosis of BV: 1)Presence of  Lactobacillus spp , G  vaginalis levels less than 6 0  log cells/mL, and absence of A  vaginae and Megasphaera  spp; or 2)Absence of all targeted organisms; or  3)Absence of Lactobacillus spp   plus G  vaginalis  detected at levels less than 6 0 log cells/mL and  absence of A  vaginae and Megasphaera spp  EQUIVOCAL FOR BV: The pattern of results is neither  supportive nor not supportive of a diagnosis of BV  The patient may be in transition into or out of BV:  Presence of Lactobacillus spp  plus G  vaginalis  (greater or equal to 6 0 log cells/mL) and/or one of  the other BV-associated pathogens  SUPPORTIVE OF BV: The pattern of results is supportive  of a diagnosis of BV: Absence of Lactobacillus spp  and  presence of G  vaginalis greater than or equal to 6 0  log cells/mL and/or one or both of the other  BV-associated pathogens       Concentration for Lactobacilli (L  acidophilus/  crispatus, L  jensenii) are collectively reported under  the term "Lactobacillus spp ", as these species are  among the peroxide producing Lactobacilli thought to  be protective against bacterial vaginosis  Atopobium  vaginae, Megasphaera spp , and Gardnerella (greater  than 6 0 log cells/mL) have been associated with  vaginosis when present in the absence of peroxide  producing Lactobacilli  This test was developed and its analytical  performance characteristics have been determined  by [employer], [place]  It has not been cleared or approved by the FDA  This  assay has been validated pursuant to the CLIA  regulations and is used for clinical purposes  TRICHOMONAS VAGINALIS RNA$QUALITATIVE TMA Not Detected  Not Detected   This test was performed using the APTIMA Trichomonas  vaginalis Assay ([employer])  For more information on this test, go to  http://Stylistpick/faq/  Trichomonastma   C  ALBICANS, DNA Detected A Not Detected   C  GLABRATA, DNA Not Detected  Not Detected   C  TROPICALIS, DNA Not Detected  Not Detected   C  PARAPSILOSIS, DNA Not Detected  Not Detected   This test was developed and its analytical  performance characteristics have been determined  by [facility], [place]  It has not been cleared or approved by the FDA  This  assay has been validated pursuant to the CLIA  regulations and is used for clinical purposes

## 2018-01-11 NOTE — MISCELLANEOUS
Message  Jeniffer Umaña is not our pt, she is a pnat 37 wks, she was seen in [place] [address] er 6/5/17 & wanted to have a fu er visit & transfer to our off as a NEWPT  her ins is [facility] which we do not Par with  I mentioned to Mark Odonnell she should have a 2 day fu with her current obgyn & then gave her the [facility] # to call  Active Problems    1  11 weeks gestation of pregnancy (V22 2) (Z3A 11)   2  Bacterial vaginosis (616 10,041 9) (N76 0,B96 89)   3  Encounter for contraceptive management (V25 9) (Z30 9)   4  Encounter for IUD removal (V25 12) (Z30 432)   5  Encounter for routine gynecological examination (V72 31) (Z01 419)   6  Missed periods (626 4) (N92 6)   7  Multigravida in first trimester (V22 1) (Z34 81)   8  Nausea with vomiting (787 01) (R11 2)   9  Positive urine pregnancy test (V72 42) (Z32 01)   10  Screening for STD (sexually transmitted disease) (V74 5) (Z11 3)   11  Trichomonal vulvovaginitis (131 01) (A59 01)   12  Urinary tract infection (599 0) (N39 0)   13  Vaginal discharge (623 5) (N89 8)   14  Yeast infection of the vagina (112 1) (B37 3)    Current Meds   1  Metoclopramide HCl - 10 MG Oral Tablet; TAKE 1 TABLET EVERY 6 HOURS AS   NEEDED; Therapy: 91LQS6562 to (Evaluate:41Npw7176)  Requested for: 82QKA7453; Last   Rx:30Nov2016 Ordered   2  Prenatal One Daily 27-0 8 MG Oral Tablet; TAKE 1 TABLET DAILY AS DIRECTED; Therapy: 45ELR2569 to (Evaluate:39Mkc4417)  Requested for: 05YWY9552; Last   Rx:30Nov2016 Ordered    Allergies    1   No Known Drug Allergies    Signatures   Electronically signed by : Sirena Maravilla, ; Jun 8 2017  7:57AM EST                       (Author)

## 2018-01-12 VITALS
SYSTOLIC BLOOD PRESSURE: 126 MMHG | BODY MASS INDEX: 36.26 KG/M2 | DIASTOLIC BLOOD PRESSURE: 61 MMHG | WEIGHT: 231 LBS | HEIGHT: 67 IN | HEART RATE: 92 BPM

## 2018-01-13 NOTE — RESULT NOTES
Message  Trichomonas on sureswab, pt was treated for BV with Metrogel which will not be sufficient, will Rx Metronidazole 2 gms once  Lissett Bean to call pt today and inform  Plan  Trichomonal vulvovaginitis    · MetroNIDAZOLE 500 MG Oral Tablet; take 2gms once, use 500mg tablet    Signatures   Electronically signed by : JARRETT Lozada;  Apr 4 2016 10:57AM EST                       (Author)

## 2018-01-14 NOTE — MISCELLANEOUS
Message  spoke with patient regarding trichomonas  Patient instructed to  prescription at [facility] and take as ordered-- and to avoid alcohol  Patient verbalizes understanding  Active Problems    1  Bacterial vaginosis (616 10,041 9) (N76 0,B96 89)   2  Encounter for contraceptive management (V25 9) (Z30 9)   3  Encounter for routine gynecological examination (V72 31) (Z01 419)   4  Screening for STD (sexually transmitted disease) (V74 5) (Z11 3)   5  Trichomonal vulvovaginitis (131 01) (A59 01)   6  Urinary tract infection (599 0) (N39 0)    Current Meds   1  Low-Ogestrel 0 3-30 MG-MCG Oral Tablet; take 1 tablet by mouth once daily; Therapy: 92SKV1358 to (Last Rx:29Mar2016)  Requested for: 29Mar2016 Ordered   2  MetroNIDAZOLE 0 75 % Vaginal Gel; insert one applicatorful per vagina at HS x 5; Therapy: 07USR9556 to (Rakan Sharps)  Requested for: 94CEV8620; Last   Rx:29Mar2016 Ordered   3  MetroNIDAZOLE 500 MG Oral Tablet; take 2gms once, use 500mg tablet; Therapy: 39Byl0400 to (Sharath Stokes)  Requested for: 04Apr2016; Last   Rx:04Apr2016 Ordered    Allergies    1   No Known Drug Allergies    Signatures   Electronically signed by : Can Carr, ; Apr 4 2016 11:03AM EST                       (Author)

## 2018-01-16 NOTE — MISCELLANEOUS
Provider Comments  Provider Comments:   PT NO SHOW FOR HER PP APPT        Signatures   Electronically signed by : Sp Garcia, ; Jul 5 2017  2:57PM EST                       (Author)

## 2018-01-17 ENCOUNTER — HOSPITAL ENCOUNTER (EMERGENCY)
Facility: HOSPITAL | Age: 28
Discharge: HOME | End: 2018-01-17
Admitting: EMERGENCY MEDICINE
Payer: COMMERCIAL

## 2018-01-17 VITALS
WEIGHT: 210 LBS | OXYGEN SATURATION: 99 % | HEART RATE: 81 BPM | HEIGHT: 67 IN | BODY MASS INDEX: 32.96 KG/M2 | SYSTOLIC BLOOD PRESSURE: 155 MMHG | RESPIRATION RATE: 18 BRPM | DIASTOLIC BLOOD PRESSURE: 71 MMHG | TEMPERATURE: 98.4 F

## 2018-01-17 DIAGNOSIS — K08.89 PAIN, DENTAL: Primary | ICD-10-CM

## 2018-01-17 PROCEDURE — 99282 EMERGENCY DEPT VISIT SF MDM: CPT

## 2018-01-17 RX ORDER — NAPROXEN 500 MG/1
500 TABLET ORAL 2 TIMES DAILY WITH MEALS
Qty: 14 TABLET | Refills: 0 | Status: SHIPPED | OUTPATIENT
Start: 2018-01-17 | End: 2018-03-16 | Stop reason: ALTCHOICE

## 2018-01-17 RX ORDER — PENICILLIN V POTASSIUM 500 MG/1
500 TABLET ORAL 4 TIMES DAILY
Qty: 40 TABLET | Refills: 0 | Status: SHIPPED | OUTPATIENT
Start: 2018-01-17 | End: 2018-01-27

## 2018-01-17 NOTE — DISCHARGE INSTRUCTIONS
Toothache   WHAT YOU NEED TO KNOW:   A toothache is pain that is caused by irritation of the nerves in the center of your tooth  The irritation may be caused by several problems, such as a cavity, an infection, a cracked tooth, or gum disease  It is very important to follow up with your dentist so the cause of your toothache can be diagnosed and treated  This can help prevent more serious problems  DISCHARGE INSTRUCTIONS:   Medicines: You may  need any of the following:  · NSAIDs  decrease swelling and pain  This medicine can be bought with or without a doctor's order  This medicine can cause stomach bleeding or kidney problems in certain people  If you take blood thinner medicine, always ask your healthcare provider if NSAIDs are safe for you  Always read the medicine label and follow the directions on it before using this medicine  · Acetaminophen  decreases pain  It is available without a doctor's order  Ask how much to take and how often to take it  Follow directions  Acetaminophen can cause liver damage if not taken correctly  · Pain medicine  may be given as a pill or as medicine that you put directly on your tooth or gums  Do not wait until the pain is severe before you take this medicine  · Antibiotics  help fight or prevent an infection caused by bacteria  Take them as directed  · Take your medicine as directed  Contact your healthcare provider if you think your medicine is not helping or if you have side effects  Tell him of her if you are allergic to any medicine  Keep a list of the medicines, vitamins, and herbs you take  Include the amounts, and when and why you take them  Bring the list or the pill bottles to follow-up visits  Carry your medicine list with you in case of an emergency  Follow up with your dentist as directed: You may be referred to a dental surgeon  Write down your questions so you remember to ask them during your visits     Self-care:   · Rinse your mouth with warm salt water 4 times a day or as directed  · You may need to eat soft foods to help relieve pain caused by chewing  Contact your dentist if:   · You have questions or concerns about your condition or care  Return to the emergency department if:   · You have trouble breathing  · You have swelling in your face or neck  · You have a fever and chills  · You have trouble speaking or swallowing  · You have trouble opening or closing your mouth  © 2017 [facility] Information is for End User's use only and may not be sold, redistributed or otherwise used for commercial purposes  All illustrations and images included in CareNotes® are the copyrighted property of A D A M , Inc  or Ajay Gaitan  The above information is an  only  It is not intended as medical advice for individual conditions or treatments  Talk to your doctor, nurse or pharmacist before following any medical regimen to see if it is safe and effective for you

## 2018-01-17 NOTE — ED PROVIDER NOTES
History  Chief Complaint   Patient presents with    Dental Pain     patient has tooth pain since yesterday, does not have an appointment with dentist for 2 months       History provided by:  Patient  Dental Pain   Location:  Upper  Upper teeth location:  13/LU 2nd bicuspid  Quality:  Aching  Severity:  Moderate  Onset quality:  Gradual  Duration:  2 days  Timing:  Constant  Progression:  Worsening  Chronicity:  New  Context: dental fracture    Context comment:  Broken for a long time however recently broke more  Relieved by:  NSAIDs  Worsened by:  Cold food/drink and hot food/drink  Associated symptoms: oral bleeding    Associated symptoms: no difficulty swallowing, no drooling, no facial pain, no facial swelling, no fever, no gum swelling and no trismus    Risk factors: lack of dental care        Prior to Admission Medications   Prescriptions Last Dose Informant Patient Reported? Taking?   acetaminophen (TYLENOL) 325 mg tablet 1/17/2018 at Unknown time  Yes Yes   Sig: Take 650 mg by mouth every 6 (six) hours as needed for mild pain   ibuprofen (MOTRIN) 600 mg tablet 1/17/2018 at Unknown time  No Yes   Sig: Take 1 tablet by mouth every 6 (six) hours as needed for moderate pain      Facility-Administered Medications: None       Past Medical History:   Diagnosis Date    Varicella     had Vaccine       Past Surgical History:   Procedure Laterality Date    MT LAP,RMV  ADNEXAL STRUCTURE Bilateral 9/5/2017    Procedure: Sandra Stacks;  Surgeon: Aggie Cohen MD;  [facility]: [facility];  Service: Gynecology       Family History   Problem Relation Age of Onset    Hypertension Mother     Diabetes Father      I have reviewed and agree with the history as documented      Social History   Substance Use Topics    Smoking status: Current Some Day Smoker     Packs/day: 0 20     Last attempt to quit: 11/16/2016    Smokeless tobacco: Never Used    Alcohol use No        Review of Systems   Constitutional: Negative for chills and fever  HENT: Positive for dental problem  Negative for drooling, facial swelling and sore throat  Respiratory: Negative for shortness of breath  Gastrointestinal: Negative for nausea and vomiting  Skin: Negative for rash and wound  Physical Exam  ED Triage Vitals [01/17/18 1214]   Temperature Pulse Respirations Blood Pressure SpO2   98 4 °F (36 9 °C) 81 18 155/71 99 %      Temp Source Heart Rate Source Patient Position - Orthostatic VS BP Location FiO2 (%)   Oral Monitor Sitting Left arm --      Pain Score       Worst Possible Pain           Orthostatic Vital Signs  Vitals:    01/17/18 1214   BP: 155/71   Pulse: 81   Patient Position - Orthostatic VS: Sitting       Physical Exam   Constitutional: She appears well-developed and well-nourished  HENT:   Mouth/Throat: Oropharynx is clear and moist  No trismus in the jaw  No dental abscesses or uvula swelling  Eyes: Conjunctivae are normal    Neck: Neck supple  Cardiovascular: Normal rate, regular rhythm and normal heart sounds  Pulmonary/Chest: Effort normal and breath sounds normal    Lymphadenopathy:     She has no cervical adenopathy  Skin: Skin is warm and dry  Capillary refill takes less than 2 seconds  Psychiatric: She has a normal mood and affect  Nursing note and vitals reviewed        ED Medications  Medications - No data to display    Diagnostic Studies  Results Reviewed     None                 No orders to display              Procedures  Procedures       Phone Contacts  ED Phone Contact    ED Course  ED Course                                MDM  CritCare Time    Disposition  Final diagnoses:   Pain, dental     Time reflects when diagnosis was documented in both MDM as applicable and the Disposition within this note     Time User Action Codes Description Comment    1/17/2018 12:42 PM Makenna Diego Add [K08 89] Pain, dental       ED Disposition     ED Disposition Condition Comment    Discharge Dina Villalobos discharge to home/self care  Condition at discharge: Good        Follow-up Information     Follow up With Specialties Details Why Contact Info Additional [address]  In 3 days  [address] Emergency Department Emergency Medicine  If symptoms worsen [address]  [phone]  ED, [address] [place] [address], [address]        Patient's Medications   Discharge Prescriptions    NAPROXEN (EC NAPROSYN) 500 MG EC TABLET    Take 1 tablet by mouth 2 (two) times a day with meals for 7 days       Start Date: 1/17/2018 End Date: 1/24/2018       Order Dose: 500 mg       Quantity: 14 tablet    Refills: 0    PENICILLIN V POTASSIUM (VEETID) 500 MG TABLET    Take 1 tablet by mouth 4 (four) times a day for 10 days       Start Date: 1/17/2018 End Date: 1/27/2018       Order Dose: 500 mg       Quantity: 40 tablet    Refills: 0     No discharge procedures on file      ED Provider  Electronically Signed by           Rafael Hilton PA-C  01/17/18 3876

## 2018-01-22 VITALS
HEART RATE: 89 BPM | WEIGHT: 220 LBS | BODY MASS INDEX: 34.53 KG/M2 | SYSTOLIC BLOOD PRESSURE: 115 MMHG | DIASTOLIC BLOOD PRESSURE: 60 MMHG | HEIGHT: 67 IN

## 2018-01-22 VITALS
DIASTOLIC BLOOD PRESSURE: 74 MMHG | WEIGHT: 225.25 LBS | HEIGHT: 67 IN | SYSTOLIC BLOOD PRESSURE: 121 MMHG | BODY MASS INDEX: 35.35 KG/M2

## 2018-03-16 ENCOUNTER — HOSPITAL ENCOUNTER (EMERGENCY)
Facility: HOSPITAL | Age: 28
Discharge: HOME | End: 2018-03-16
Attending: EMERGENCY MEDICINE
Payer: COMMERCIAL

## 2018-03-16 VITALS
TEMPERATURE: 98.2 F | SYSTOLIC BLOOD PRESSURE: 120 MMHG | WEIGHT: 247.5 LBS | BODY MASS INDEX: 38.76 KG/M2 | DIASTOLIC BLOOD PRESSURE: 60 MMHG | RESPIRATION RATE: 16 BRPM | HEART RATE: 97 BPM | OXYGEN SATURATION: 100 %

## 2018-03-16 DIAGNOSIS — Z20.7 EXPOSURE TO HEAD LICE: Primary | ICD-10-CM

## 2018-03-16 PROCEDURE — 99282 EMERGENCY DEPT VISIT SF MDM: CPT

## 2018-03-16 NOTE — ED PROVIDER NOTES
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  Patient currently is not having symptoms  Patient states she does have significant dandruff which is normal for her  On exam however there is no nits or lice witnessed  I offered patient permethrin if she is worried about it, however I do not believe she currently has lice  None       Past Medical History:   Diagnosis Date    Varicella     had Vaccine       Past Surgical History:   Procedure Laterality Date    NC LAP,RMV  ADNEXAL STRUCTURE Bilateral 9/5/2017    Procedure: Kay Cardozo;  Surgeon: Leonor River MD;  Location: [facility];  Service: Gynecology       Family History   Problem Relation Age of Onset    Hypertension Mother     Diabetes Father      I have reviewed and agree with the history as documented  Social History   Substance Use Topics    Smoking status: Current Some Day Smoker     Packs/day: 0 20     Types: Cigarettes     Last attempt to quit: 11/16/2016    Smokeless tobacco: Never Used    Alcohol use No        Review of Systems   Constitutional: Negative for activity change, chills, fatigue and fever  HENT: Negative for congestion, ear pain, mouth sores, sore throat and trouble swallowing  Eyes: Negative for photophobia and visual disturbance  Respiratory: Negative for chest tightness, shortness of breath and wheezing  Cardiovascular: Negative for chest pain and palpitations  Gastrointestinal: Negative for abdominal pain, constipation, diarrhea, nausea and vomiting  Genitourinary: Negative for decreased urine volume, difficulty urinating, dysuria, genital sores and hematuria  Musculoskeletal: Negative for arthralgias, myalgias, neck pain and neck stiffness  Skin: Negative for rash and wound  Neurological: Negative for dizziness, syncope, weakness, light-headedness, numbness and headaches  Hematological: Negative for adenopathy  All other systems reviewed and are negative  Physical Exam  ED Triage Vitals [03/16/18 1352]   Temperature Pulse Respirations Blood Pressure SpO2   98 2 °F (36 8 °C) 97 16 120/60 100 %      Temp Source Heart Rate Source Patient Position - Orthostatic VS BP Location FiO2 (%)   Oral -- Sitting Right arm --      Pain Score       No Pain           Orthostatic Vital Signs  Vitals:    03/16/18 1352   BP: 120/60   Pulse: 97   Patient Position - Orthostatic VS: Sitting       Physical Exam   Constitutional: She is oriented to person, place, and time  She appears well-developed and well-nourished  No distress  HENT:   Head: Normocephalic and atraumatic  Right Ear: External ear normal    Left Ear: External ear normal    Nose: Nose normal    Mouth/Throat: Oropharynx is clear and moist    Marked dandruff, no lice seen   Eyes: Conjunctivae and EOM are normal  Pupils are equal, round, and reactive to light  No scleral icterus  Neck: Normal range of motion  Neck supple  Cardiovascular: Normal rate, regular rhythm, normal heart sounds and intact distal pulses  Exam reveals no gallop and no friction rub  No murmur heard  Pulmonary/Chest: Effort normal and breath sounds normal  No respiratory distress  She has no wheezes  Abdominal: Soft  She exhibits no distension  There is no tenderness  There is no guarding  Musculoskeletal: Normal range of motion   She exhibits no edema, tenderness or deformity  Lymphadenopathy:     She has no cervical adenopathy  Neurological: She is alert and oriented to person, place, and time  Skin: Skin is warm and dry  Capillary refill takes less than 2 seconds  She is not diaphoretic  Nursing note and vitals reviewed  ED Medications  Medications - No data to display    Diagnostic Studies  Results Reviewed     None                 No orders to display              Procedures  Procedures       Phone Contacts  ED Phone Contact    ED Course  ED Course                                MDM  Number of Diagnoses or Management Options  Exposure to head lice: new and does not require workup  Risk of Complications, Morbidity, and/or Mortality  Presenting problems: low  Diagnostic procedures: minimal  Management options: low    Patient Progress  Patient progress: stable    CritCare Time    Disposition  Final diagnoses:   Exposure to head lice     Time reflects when diagnosis was documented in both MDM as applicable and the Disposition within this note     Time User Action Codes Description Comment    3/16/2018  3:34 PM Vinnie Wallace Conception Add [Z20 7] Exposure to head lice       ED Disposition     ED Disposition Condition Comment    Discharge  Cande Torres discharge to home/self care      Condition at discharge: Stable        Follow-up Information     Follow up With Specialties Details Why Contact Info Additional Information    Fransico Harman MD Family Medicine Schedule an appointment as soon as possible for a visit As needed [address] [phone]       [facility] Emergency Department Emergency Medicine  If symptoms worsen [address] [facility] ED, [address]  , [place], [place], [address]        Discharge Medication List as of 3/16/2018  3:36 PM      START taking these medications    Details   permethrin (NIX) 1 % lotion Apply topically once for 1 dose Shampoo, rinse and towel dry hair, saturate hair and scalp with permethrin  Rinse after 10 min; repeat in 1 week if needed, Starting Fri 3/16/2018, Print           No discharge procedures on file      ED Provider  Electronically Signed by           Yany Santiago PA-C  03/16/18 1600

## 2019-08-26 ENCOUNTER — OFFICE VISIT (OUTPATIENT)
Dept: OBGYN CLINIC | Facility: CLINIC | Age: 29
End: 2019-08-26

## 2019-08-26 VITALS
BODY MASS INDEX: 36.79 KG/M2 | DIASTOLIC BLOOD PRESSURE: 81 MMHG | WEIGHT: 234.4 LBS | SYSTOLIC BLOOD PRESSURE: 128 MMHG | HEART RATE: 92 BPM | HEIGHT: 67 IN

## 2019-08-26 DIAGNOSIS — Z01.419 WOMEN'S ANNUAL ROUTINE GYNECOLOGICAL EXAMINATION: Primary | ICD-10-CM

## 2019-08-26 PROBLEM — O09.30 LATE PRENATAL CARE AFFECTING PREGNANCY: Status: RESOLVED | Noted: 2017-02-13 | Resolved: 2019-08-26

## 2019-08-26 PROBLEM — Z3A.38 38 WEEKS GESTATION OF PREGNANCY: Status: RESOLVED | Noted: 2017-06-16 | Resolved: 2019-08-26

## 2019-08-26 PROBLEM — O28.1 ABNORMAL BIOCHEMICAL FINDING ON ANTENATAL SCREENING OF MOTHER: Status: RESOLVED | Noted: 2017-04-12 | Resolved: 2019-08-26

## 2019-08-26 PROCEDURE — G0145 SCR C/V CYTO,THINLAYER,RESCR: HCPCS | Performed by: NURSE PRACTITIONER

## 2019-08-26 PROCEDURE — 87624 HPV HI-RISK TYP POOLED RSLT: CPT | Performed by: NURSE PRACTITIONER

## 2019-08-26 PROCEDURE — 3725F SCREEN DEPRESSION PERFORMED: CPT | Performed by: NURSE PRACTITIONER

## 2019-08-26 PROCEDURE — 99395 PREV VISIT EST AGE 18-39: CPT | Performed by: NURSE PRACTITIONER

## 2019-08-26 NOTE — PATIENT INSTRUCTIONS
Human Papillomavirus Vaccine (By injection)   Human Papillomavirus Vaccine (HUE-man pap-ah-CHITO-viraj-VYE-daniele VAX-een)  Helps prevent genital warts and cancer of the anus, cervix, vagina, or vulva, which may be caused by human papillomavirus (HPV)  Brand Name(s): Cervarix, Gardasil, Gardasil 9   There may be other brand names for this medicine  When This Medicine Should Not Be Used: This vaccine is not right for everyone  You should not receive it if you had an allergic reaction to human papillomavirus vaccine or to yeast   How to Use This Medicine:   Injectable  · Your doctor will prescribe your exact dose and tell you how often it should be given  This medicine is given as a shot into one of your muscles  · A nurse or other health provider will give you this medicine  · This vaccine must be given as 2 or 3 doses based on the brand  · Read and follow the patient instructions that come with this medicine  Talk to your doctor or pharmacist if you have any questions  · Missed dose: This vaccine needs to be given on a fixed schedule  If you miss your scheduled shot, call your doctor to make another appointment as soon as possible  Drugs and Foods to Avoid:   Ask your doctor or pharmacist before using any other medicine, including over-the-counter medicines, vitamins, and herbal products  · Some medicines can affect how this vaccine works  Tell your doctor if you are using any treatment that weakens the immune system, such as cancer medicine, radiation treatment, or a steroid  Warnings While Using This Medicine:   · Tell your doctor if you are pregnant or breastfeeding, or if you have a weak immune system or are allergic to latex  · This vaccine will not protect you against sexually transmitted diseases that are not caused by HPV  · You still need to see your doctor for routine screening tests for anal or cervical cancer even after you receive this vaccine    · You may feel faint, lightheaded, or dizzy right after you receive this vaccine  Your doctor may ask you to wait 15 minutes before standing  Possible Side Effects While Using This Medicine:   Call your doctor right away if you notice any of these side effects:  · Allergic reaction: Itching or hives, swelling in your face or hands, swelling or tingling in your mouth or throat, chest tightness, trouble breathing  · Lightheadedness, dizziness, or fainting  If you notice these less serious side effects, talk with your doctor:   · Headache, tiredness  · Mild fever  · Pain, redness, itching, or swelling where the shot is given  If you notice other side effects that you think are caused by this medicine, tell your doctor  Call your doctor for medical advice about side effects  You may report side effects to FDA at 0-485-PAI-8432  © 2017 [address] Larry [employer] Information is for End User's use only and may not be sold, redistributed or otherwise used for commercial purposes  The above information is an  only  It is not intended as medical advice for individual conditions or treatments  Talk to your doctor, nurse or pharmacist before following any medical regimen to see if it is safe and effective for you  Breast Self Exam for Women   WHAT YOU NEED TO KNOW:   What is a breast self-exam (BSE)? A BSE is a way to check your breasts for lumps and other changes  Regular BSEs can help you know how your breasts normally look and feel  Most breast lumps or changes are not cancer, but you should always have them checked by a healthcare provider  Your healthcare provider can also watch you do a BSE and can tell you if you are doing your BSE correctly  Why should I do a BSE? Breast cancer is the most common type of cancer in women  Even if you have mammograms, you may still want to do a BSE regularly  If you know how your breasts normally feel and look, it may help you know when to contact your healthcare provider  Mammograms can miss some cancers   You may find a lump during a BSE that did not show up on your mammogram   When should I do a BSE? Philip your calendar to help you remember to do BSE on a regular schedule  One easy way to remember to do a BSE is to do the exam on the same day of each month  If you have periods, you may want to do your BSE 1 week after your period ends  This is the time when your breasts may be the least swollen, lumpy, or tender  You can do regular BSEs even if you are breastfeeding or have breast implants  How should I do a BSE? · Look at your breasts in a mirror  Look at the size and shape of each breast and nipple  Check for swelling, lumps, dimpling, scaly skin, or other skin changes  Look for nipple changes, such as a nipple that is painful or beginning to pull inward  Gently squeeze both nipples and check to see if fluid (that is not breast milk) comes out of them  If you find any of these or other breast changes, contact your healthcare provider  Check your breasts while you sit or  the following 3 positions:    [facility] your arms down at your sides  ¨ Raise your hands and join them behind your head  ¨ Put firm pressure with your hands on your hips  Bend slightly forward while you look at your breasts in the mirror  · Lie down and feel your breasts  When you lie down, your breast tissue spreads out evenly over your chest  This makes it easier for you to feel for lumps and anything that may not be normal for your breasts  Do a BSE on one breast at a time  ¨ Place a small pillow or towel under your left shoulder  Put your left arm behind your head  ¨ Use the 3 middle fingers of your right hand  Use your fingertip pads, on the top of your fingers  Your fingertip pad is the most sensitive part of your finger  ¨ Use small circles to feel your breast tissue  Use your fingertip pads to make dime-sized, overlapping circles on your breast and armpits  Use light, medium, and firm pressure   First, press lightly  Second, press with medium pressure to feel a little deeper into the breast  Last, use firm pressure to feel deep within your breast     ¨ Examine your entire breast area  Examine the breast area from above the breast to below the breast where you feel only ribs  Make small circles with your fingertips, starting in the middle of your armpit  Make circles going up and down the breast area  Continue toward your breast and all the way across it  Examine the area from your armpit all the way over to the middle of your chest (breastbone)  Stop at the middle of your chest     ¨ Move the pillow or towel to your right shoulder, and put your right arm behind your head  Use the 3 fingertip pads of your left hand, and repeat the above steps to do a BSE on your right breast        What else can I do to check for breast problems or cancer? Some experts suggest that women 36years of age or older should have a mammogram every year  Other experts suggest that women between the ages of 48and 76years old should have a mammogram every 2 years  Talk to your healthcare provider about when you should have a mammogram   When should I contact my healthcare provider? · You find any lumps or changes in your breasts  · You have breast pain or fluid coming from your nipples  · You have questions or concerns or concerns about your condition or care  CARE AGREEMENT:   You have the right to help plan your care  Learn about your health condition and how it may be treated  Discuss treatment options with your caregivers to decide what care you want to receive  You always have the right to refuse treatment  The above information is an  only  It is not intended as medical advice for individual conditions or treatments  Talk to your doctor, nurse or pharmacist before following any medical regimen to see if it is safe and effective for you    © 2017 [employer] Larry Morales Information is for End User's use only and may not be sold, redistributed or otherwise used for commercial purposes  All illustrations and images included in CareNotes® are the copyrighted property of A D A M , [employer]  or Ajay Gaitan  Pap Smear   WHAT YOU NEED TO KNOW:   What do I need to know about a Pap smear? A Pap smear, or Pap test, is used to screen for cervical cancer  It is also used to find precancerous and cancerous cells of the vulva and vagina  How do I prepare for a Pap smear? The best time to schedule the test is right after your period stops  Do not have a Pap smear during your monthly period  What will happen during a Pap smear? · You will lie on your back and place your feet on footrests called stirrups  Your healthcare provider will gently insert a device called a speculum into your vagina  The speculum is used to open the walls of your vagina so he can see your cervix  · Your healthcare provider will gently scrape your cervix and vaginal areas for cell samples  The samples are placed in a container with liquid or on a glass slide  They are sent to a lab and examined for abnormal cells  A test for Human Papillomavirus (HPV) may be done at the same time  HPV is a sexually transmitted virus that can cause changes in cervical cells  What will happen after a Pap smear? Your healthcare provider will tell you when you can expect your Pap smear results  You may have some spotting the day of your procedure  How often do I need a Pap smear? Pap smears are usually done every 3 to 5 years depending on your age  You may need a Pap smear more often if you have any of the following:  · Positive test result for the human papillomavirus (HPV)    · A history of cervical cancer    · HIV    · A weak immune system    · Exposure to diethylstilbestrol (RENNY) medicine when your mother was pregnant with you  CARE AGREEMENT:   You have the right to help plan your care  Learn about your health condition and how it may be treated  Discuss treatment options with your caregivers to decide what care you want to receive  You always have the right to refuse treatment  The above information is an  only  It is not intended as medical advice for individual conditions or treatments  Talk to your doctor, nurse or pharmacist before following any medical regimen to see if it is safe and effective for you  © 2017 2600 Larry Morales Information is for End User's use only and may not be sold, redistributed or otherwise used for commercial purposes  All illustrations and images included in CareNotes® are the copyrighted property of A D A M , Inc  or Ajay Gaitan

## 2019-08-26 NOTE — PROGRESS NOTES
Yeison Galvez is a 30 y o  female who presents for annual well woman exam   Patient believes last Pap smear was approximately 3 years ago  Patient denies history of abnormal Pap smear  Will collect Pap smear today  Periods are regular every 28-30 days, lasting 5 days  No intermenstrual bleeding, spotting, or discharge  Current contraception: tubal ligation  History of abnormal Pap smear: no  Family history of uterine or ovarian cancer: no  Regular self breast exam: yes  History of abnormal mammogram:  Mammograms to begin at age 34  Family history of breast cancer: no  History of abnormal lipids: no  Gardasil vaccine:No    Menstrual History:  OB History        5    Para   5    Term   5       0    AB   0    Living   5       SAB   0    TAB   0    Ectopic   0    Multiple   0    Live Births   3                Menarche age: 13  Patient's last menstrual period was 2019 (approximate)  Period Cycle (Days): (monthly )  Period Duration (Days): 5  Period Pattern: Regular  Menstrual Flow: Heavy, Moderate, Light  Dysmenorrhea: (!) Mild(IBU - with relief )  Dysmenorrhea Symptoms: Cramping, Headache    The following portions of the patient's history were reviewed and updated as appropriate: allergies, current medications, past family history, past medical history, past social history, past surgical history and problem list     Review of Systems  Pertinent items are noted in HPI  Objective      /81 (BP Location: Right arm, Patient Position: Sitting, Cuff Size: Large)   Pulse 92   Ht 5' 7" (1 702 m)   Wt 106 kg (234 lb 6 4 oz)   LMP 2019 (Approximate)   Breastfeeding?  No   BMI 36 71 kg/m²     General:   alert and oriented, in no acute distress, alert, appears stated age and cooperative   Heart: regular rate and rhythm, S1, S2 normal, no murmur, click, rub or gallop   Lungs: clear to auscultation bilaterally   Abdomen: soft, non-tender, without masses or organomegaly, nondistended and normal bowel sounds   Vulva: normal, Bartholin's, Urethra, [place]'s normal, female escutcheon   Vagina: normal mucosa, normal discharge   Cervix: no bleeding following Pap, no cervical motion tenderness and no lesions   Uterus: normal size, non-tender, normal shape and consistency   Adnexa: normal adnexa and no mass, fullness, tenderness   Breast:  Nontender, no palpable masses, no nipple discharge, no skin changes bilaterally          Assessment      @[email or web address] woman@   Plan      All questions answered  Await pap smear results  Contraception: tubal ligation  Diagnosis explained in detail, including differential   Dietary diary  Discussed healthy lifestyle modifications  Educational material distributed  Follow up in 1 Year for annual exam   Follow up as needed  Thin prep Pap smear  Breast awareness reviewed    BMI Counseling: Body mass index is 36 71 kg/m²  Discussed the patient's BMI with her  The BMI is above average  BMI counseling and education was provided to the patient  Nutrition recommendations include reducing portion sizes, decreasing overall calorie intake and 3-5 servings of fruits/vegetables daily  Exercise recommendations include moderate aerobic physical activity for 150 minutes/week, joining a gym and strength training exercises  Tobacco Cessation Counseling: Tobacco cessation counseling was not provided  The patient is sincerely urged to quit consumption of tobacco  She is not ready to quit tobacco  The numerous health risks of tobacco consumption were discussed  Patient encouraged to stop smoking marijuana  Patient encouraged to continue follow-up with PCP for chronic conditions  Patient encouraged healthy lifestyle, diet and exercise regimen  Gardasil vaccine recommendations reviewed  Written information provided  Patient will call office if she desires vaccine series

## 2019-08-27 LAB
HPV HR 12 DNA CVX QL NAA+PROBE: NEGATIVE
HPV16 DNA CVX QL NAA+PROBE: NEGATIVE
HPV18 DNA CVX QL NAA+PROBE: NEGATIVE

## 2019-08-28 LAB
LAB AP GYN PRIMARY INTERPRETATION: NORMAL
Lab: NORMAL

## 2020-09-01 ENCOUNTER — TELEPHONE (OUTPATIENT)
Dept: OBGYN CLINIC | Facility: CLINIC | Age: 30
End: 2020-09-01

## 2020-09-17 ENCOUNTER — TELEPHONE (OUTPATIENT)
Dept: ENDOCRINOLOGY | Facility: CLINIC | Age: 30
End: 2020-09-17

## 2020-09-17 ENCOUNTER — OFFICE VISIT (OUTPATIENT)
Dept: ENDOCRINOLOGY | Facility: CLINIC | Age: 30
End: 2020-09-17
Payer: COMMERCIAL

## 2020-09-17 VITALS
WEIGHT: 232 LBS | HEIGHT: 67 IN | HEART RATE: 70 BPM | BODY MASS INDEX: 36.41 KG/M2 | SYSTOLIC BLOOD PRESSURE: 100 MMHG | TEMPERATURE: 96.6 F | DIASTOLIC BLOOD PRESSURE: 60 MMHG

## 2020-09-17 DIAGNOSIS — E55.9 VITAMIN D DEFICIENCY: ICD-10-CM

## 2020-09-17 DIAGNOSIS — E66.9 OBESITY (BMI 35.0-39.9 WITHOUT COMORBIDITY): ICD-10-CM

## 2020-09-17 DIAGNOSIS — E61.1 IRON DEFICIENCY: ICD-10-CM

## 2020-09-17 DIAGNOSIS — E28.2 POLYCYSTIC OVARIAN SYNDROME: ICD-10-CM

## 2020-09-17 DIAGNOSIS — E03.9 HYPOTHYROIDISM, UNSPECIFIED TYPE: Primary | ICD-10-CM

## 2020-09-17 DIAGNOSIS — G47.30 SLEEP APNEA, UNSPECIFIED TYPE: ICD-10-CM

## 2020-09-17 PROCEDURE — 99204 OFFICE O/P NEW MOD 45 MIN: CPT | Performed by: INTERNAL MEDICINE

## 2020-09-17 NOTE — TELEPHONE ENCOUNTER
[facility] [phone]

## 2020-09-17 NOTE — PROGRESS NOTES
New patient    History of Present Illness:     Mercedes Haynes is a 26 y o  female with vitamin D and iron deficiency, who presents for evaluation of " high TSH"  Patient states she went to see her PCP a year ago for fatigue and numbness of the extremities and work up showed "high TSH to 5 "  She underwent thyroid ultrasound at [facility] but does not remember the results  Patient did not follow up after that because she changed her job  She has been gaining weight for the last 3 years, but she lost 10 lb in the last two months by changing her diet  She complains of constant fatigue and states it may be due to low iron beacsue she is not taking iron supplement as she was supposed to  She reports restless leg syndrome and anxiety  She also reports hair loss and excess hair on the shin and chest    No palpitations, tremor, changes in menstrual cycle, diarrhea, constipation, sleep disturbance, blurry vision or double vision  Denies changes in neck, changes in voice, dysphagia  No Radiation exposure to head/neck/chest and no surgery to the neck  Patient reports snoring, waking up shocking sometimes, headaches and fatigue  She never had a sleep study  Her mother has hypothyroidism      Patient Active Problem List   Diagnosis    Normal vaginal delivery      Past Medical History:   Diagnosis Date    Varicella     had Vaccine      Past Surgical History:   Procedure Laterality Date    ID LAP,RMV  ADNEXAL STRUCTURE Bilateral 9/5/2017    Procedure: Pam Elias;  Surgeon: Gianni Villagomez MD;  Location: [facility];  Service: Gynecology    TUBAL LIGATION  2017      Family History   Problem Relation Age of Onset    Hypertension Mother     Multiple sclerosis Mother    [employer] Hypothyroidism Mother     Diabetes Father     No Known Problems Sister     No Known Problems Daughter     No Known Problems Son     Cancer Maternal Grandmother     Diabetes Paternal Grandmother     No Known Problems Sister     No Known Problems Son    Huma Zamarripa Autism Son     No Known Problems Son      Social History     Tobacco Use    Smoking status: Current Some Day Smoker     Packs/day: 0 25     Types: Cigarettes     Last attempt to quit: 11/16/2016     Years since quitting: 3 8    Smokeless tobacco: Never Used    Tobacco comment: Current every day smoker   - As per [place]    Substance Use Topics    Alcohol use: Yes     Comment: occasionally      No Known Allergies    No current outpatient medications on file  Review of Systems   Constitutional: Positive for fatigue  Negative for activity change, appetite change, diaphoresis, fever and unexpected weight change  HENT: Negative for trouble swallowing and voice change  Eyes: Negative for visual disturbance  Respiratory: Negative for cough and shortness of breath  Cardiovascular: Negative for chest pain, palpitations and leg swelling  Gastrointestinal: Negative for abdominal pain, constipation, diarrhea and nausea  Endocrine: Negative for cold intolerance, heat intolerance, polydipsia, polyphagia and polyuria  Musculoskeletal: Negative for back pain and joint swelling  Neurological: Negative for dizziness and tremors  Psychiatric/Behavioral: Negative for confusion  The patient is nervous/anxious  All other systems reviewed and are negative  Physical Exam:  Body mass index is 36 34 kg/m²  /60 (BP Location: Left arm, Patient Position: Sitting, Cuff Size: Large)   Pulse 70   Temp (!) 96 6 °F (35 9 °C) (Temporal)   Ht 5' 7" (1 702 m)   Wt 105 kg (232 lb)   BMI 36 34 kg/m²    Wt Readings from Last 3 Encounters:   09/17/20 105 kg (232 lb)   08/26/19 106 kg (234 lb 6 4 oz)   03/16/18 112 kg (247 lb 8 oz)       GEN: NAD, obese    E/n/m nl facies, hearing intact bilat, tongue midline, lips nl  Eyes: no stare or proptosis, nl lids and conjunctiva, EOMI  Neck: trachea midline, thyroid NT to palpation, nl in size, no nodules or neck masses noted, no cervical LAD  CV; heart reg rate s1s2 nl, no m/r/g appreciated, no ELBERT  Resp: CTAB, good effort  Ab+BS  Neuro: no tremor, 2+ DTRs in BUE  MS: no c/c in digits, moves all 4 ext, nl muscle bulk, gait nl  Skin: warm and dry, no palmar erythema, thick hair on the anterior neck/ shin  Ext: no c/c in digits, no edema bilaterally, 2+ DP and PT pulses bilat, no breaks in skin/ulcers on feet  Psych: nl mood and affect, no gross lapses in memory    DATA:  Labs/ Radiology:    No available data at this time    Impression:    Patient is a 26 year of female with likely hypothyroidism and sleep apnea  Plan:    1  Hypothyroidism: she does have non specific symptoms, that could be attributed to hypothyroidism and history of prior labs showing high TSH  We will check thyroid function tests  2- Polycystic ovarian syndrome: patient is obese and has excess terminal hair  Will check her testosterone/ DHEA-S levels    3-Obesity: diet, exercise and weight loss advised  Will check A1c and CMP  Will refer to a primary care physician  4-Restless leg syndrome, could be due to iron deficiency  Will check iron pannel and CBC  5-Vitamin D deficiency, will check Vitamin D levels  Meanwhile patient is advised to take vitamin D supplements  6- Sleep apnea: patient has symptoms of sleep apnea  Her neck size is 16 cm  She is referred to a sleep specialist     Discussed with the patient and all questioned fully answered  She will call me if any problems arise

## 2020-09-26 LAB
25(OH)D3 SERPL-MCNC: 14 NG/ML (ref 30–100)
ALBUMIN SERPL-MCNC: 3.9 G/DL (ref 3.6–5.1)
ALBUMIN/GLOB SERPL: 1.3 (CALC) (ref 1–2.5)
ALP SERPL-CCNC: 73 U/L (ref 31–125)
ALT SERPL-CCNC: 6 U/L (ref 6–29)
AST SERPL-CCNC: 7 U/L (ref 10–30)
BASOPHILS # BLD AUTO: 17 CELLS/UL (ref 0–200)
BASOPHILS NFR BLD AUTO: 0.2 %
BILIRUB SERPL-MCNC: 0.3 MG/DL (ref 0.2–1.2)
BUN SERPL-MCNC: 10 MG/DL (ref 7–25)
BUN/CREAT SERPL: ABNORMAL (CALC) (ref 6–22)
CALCIUM SERPL-MCNC: 8.9 MG/DL (ref 8.6–10.2)
CHLORIDE SERPL-SCNC: 105 MMOL/L (ref 98–110)
CO2 SERPL-SCNC: 27 MMOL/L (ref 20–32)
CREAT SERPL-MCNC: 0.74 MG/DL (ref 0.5–1.1)
DHEA-S SERPL-MCNC: 125 MCG/DL (ref 23–266)
EOSINOPHIL # BLD AUTO: 174 CELLS/UL (ref 15–500)
EOSINOPHIL NFR BLD AUTO: 2 %
ERYTHROCYTE [DISTWIDTH] IN BLOOD BY AUTOMATED COUNT: 16.3 % (ref 11–15)
EST. AVERAGE GLUCOSE BLD GHB EST-MCNC: 111 (CALC)
EST. AVERAGE GLUCOSE BLD GHB EST-SCNC: 6.2 (CALC)
GLOBULIN SER CALC-MCNC: 2.9 G/DL (CALC) (ref 1.9–3.7)
GLUCOSE SERPL-MCNC: 101 MG/DL (ref 65–99)
HBA1C MFR BLD: 5.5 % OF TOTAL HGB
HCT VFR BLD AUTO: 28.5 % (ref 35–45)
HGB BLD-MCNC: 8.1 G/DL (ref 11.7–15.5)
IRON SATN MFR SERPL: 7 % (CALC) (ref 16–45)
IRON SERPL-MCNC: 24 MCG/DL (ref 40–190)
LYMPHOCYTES # BLD AUTO: 3158 CELLS/UL (ref 850–3900)
LYMPHOCYTES NFR BLD AUTO: 36.3 %
MCH RBC QN AUTO: 22.2 PG (ref 27–33)
MCHC RBC AUTO-ENTMCNC: 28.4 G/DL (ref 32–36)
MCV RBC AUTO: 78.1 FL (ref 80–100)
MONOCYTES # BLD AUTO: 409 CELLS/UL (ref 200–950)
MONOCYTES NFR BLD AUTO: 4.7 %
NEUTROPHILS # BLD AUTO: 4942 CELLS/UL (ref 1500–7800)
NEUTROPHILS NFR BLD AUTO: 56.8 %
PLATELET # BLD AUTO: 404 THOUSAND/UL (ref 140–400)
PMV BLD REES-ECKER: 9.7 FL (ref 7.5–12.5)
POTASSIUM SERPL-SCNC: 4.1 MMOL/L (ref 3.5–5.3)
PROT SERPL-MCNC: 6.8 G/DL (ref 6.1–8.1)
RBC # BLD AUTO: 3.65 MILLION/UL (ref 3.8–5.1)
SL AMB EGFR AFRICAN AMERICAN: 124 ML/MIN/1.73M2
SL AMB EGFR NON AFRICAN AMERICAN: 107 ML/MIN/1.73M2
SODIUM SERPL-SCNC: 136 MMOL/L (ref 135–146)
T4 FREE SERPL-MCNC: 1 NG/DL (ref 0.8–1.8)
TESTOST FREE SERPL-MCNC: 1.4 PG/ML (ref 0.1–6.4)
TESTOST SERPL-MCNC: 17 NG/DL (ref 2–45)
TIBC SERPL-MCNC: 323 MCG/DL (CALC) (ref 250–450)
TSH SERPL-ACNC: 0.16 MIU/L
WBC # BLD AUTO: 8.7 THOUSAND/UL (ref 3.8–10.8)

## 2020-10-08 ENCOUNTER — TELEPHONE (OUTPATIENT)
Dept: FAMILY MEDICINE CLINIC | Facility: CLINIC | Age: 30
End: 2020-10-08

## 2020-10-13 ENCOUNTER — TELEPHONE (OUTPATIENT)
Dept: OBGYN CLINIC | Facility: CLINIC | Age: 30
End: 2020-10-13

## 2020-10-14 ENCOUNTER — ANNUAL EXAM (OUTPATIENT)
Dept: OBGYN CLINIC | Facility: CLINIC | Age: 30
End: 2020-10-14

## 2020-10-14 VITALS
BODY MASS INDEX: 35.99 KG/M2 | HEART RATE: 76 BPM | WEIGHT: 229.8 LBS | SYSTOLIC BLOOD PRESSURE: 120 MMHG | TEMPERATURE: 98.2 F | DIASTOLIC BLOOD PRESSURE: 73 MMHG

## 2020-10-14 DIAGNOSIS — Z12.39 ENCOUNTER FOR BREAST CANCER SCREENING USING NON-MAMMOGRAM MODALITY: ICD-10-CM

## 2020-10-14 DIAGNOSIS — Z01.419 ENCOUNTER FOR GYNECOLOGICAL EXAMINATION WITHOUT ABNORMAL FINDING: Primary | ICD-10-CM

## 2020-10-14 DIAGNOSIS — Z12.4 SCREENING FOR CERVICAL CANCER: ICD-10-CM

## 2020-10-14 DIAGNOSIS — Z11.3 SCREEN FOR STD (SEXUALLY TRANSMITTED DISEASE): ICD-10-CM

## 2020-10-14 PROCEDURE — 87591 N.GONORRHOEAE DNA AMP PROB: CPT | Performed by: NURSE PRACTITIONER

## 2020-10-14 PROCEDURE — 87491 CHLMYD TRACH DNA AMP PROBE: CPT | Performed by: NURSE PRACTITIONER

## 2020-10-14 PROCEDURE — 99385 PREV VISIT NEW AGE 18-39: CPT | Performed by: NURSE PRACTITIONER

## 2020-10-17 LAB
C TRACH DNA SPEC QL NAA+PROBE: NEGATIVE
N GONORRHOEA DNA SPEC QL NAA+PROBE: NEGATIVE

## 2020-12-17 ENCOUNTER — TELEMEDICINE (OUTPATIENT)
Dept: ENDOCRINOLOGY | Facility: CLINIC | Age: 30
End: 2020-12-17
Payer: COMMERCIAL

## 2020-12-17 DIAGNOSIS — E66.9 OBESITY (BMI 35.0-39.9 WITHOUT COMORBIDITY): ICD-10-CM

## 2020-12-17 DIAGNOSIS — G47.30 SLEEP APNEA, UNSPECIFIED TYPE: ICD-10-CM

## 2020-12-17 DIAGNOSIS — D50.8 OTHER IRON DEFICIENCY ANEMIA: ICD-10-CM

## 2020-12-17 DIAGNOSIS — E55.9 VITAMIN D DEFICIENCY: ICD-10-CM

## 2020-12-17 DIAGNOSIS — E03.9 HYPOTHYROIDISM (ACQUIRED): ICD-10-CM

## 2020-12-17 DIAGNOSIS — R79.89 LOW SERUM THYROID STIMULATING HORMONE (TSH): ICD-10-CM

## 2020-12-17 DIAGNOSIS — L68.0 HIRSUTISM: Primary | ICD-10-CM

## 2020-12-17 PROCEDURE — 99214 OFFICE O/P EST MOD 30 MIN: CPT | Performed by: INTERNAL MEDICINE

## 2020-12-17 RX ORDER — DEXAMETHASONE 1 MG
1 TABLET ORAL ONCE
Qty: 1 TABLET | Refills: 0 | Status: SHIPPED | OUTPATIENT
Start: 2020-12-17 | End: 2020-12-17

## 2020-12-18 ENCOUNTER — TELEPHONE (OUTPATIENT)
Dept: HEMATOLOGY ONCOLOGY | Facility: CLINIC | Age: 30
End: 2020-12-18

## 2020-12-29 ENCOUNTER — TRANSCRIBE ORDERS (OUTPATIENT)
Dept: SLEEP CENTER | Facility: CLINIC | Age: 30
End: 2020-12-29

## 2021-01-27 ENCOUNTER — CONSULT (OUTPATIENT)
Dept: HEMATOLOGY ONCOLOGY | Facility: CLINIC | Age: 31
End: 2021-01-27
Payer: COMMERCIAL

## 2021-01-27 VITALS
OXYGEN SATURATION: 98 % | DIASTOLIC BLOOD PRESSURE: 66 MMHG | HEART RATE: 78 BPM | HEIGHT: 67 IN | SYSTOLIC BLOOD PRESSURE: 142 MMHG | RESPIRATION RATE: 18 BRPM | BODY MASS INDEX: 35.47 KG/M2 | TEMPERATURE: 98.7 F | WEIGHT: 226 LBS

## 2021-01-27 DIAGNOSIS — D50.8 OTHER IRON DEFICIENCY ANEMIA: ICD-10-CM

## 2021-01-27 PROCEDURE — 99244 OFF/OP CNSLTJ NEW/EST MOD 40: CPT | Performed by: PHYSICIAN ASSISTANT

## 2021-01-27 NOTE — PROGRESS NOTES
Hematology/Oncology Outpatient Consult  Isela Beckford 26 y o  female 1990 [id number]    Date:  1/27/2021      Assessment and Plan:    1  Other iron deficiency anemia  26year old female presents for consult for iron deficiency anemia  She states she has been told she has had iron deficiency for a long time  She takes oral iron about 4-5 times a week  She has constipation when taking this but takes stool softener with benefit  She does not have menorrhagia  She does not have GI bleeding symptoms, or malabsorption symptoms  Most recent labs were in Sept 2020  She needs repeat labs  Occult stool testing  Follow up in 2 months      - CBC and differential; Future  - Iron Panel (Includes Ferritin, Iron Sat%, Iron, and TIBC); Futur  - Occult stool testing      HPI:  68-year-old female presents for consultation regarding iron deficiency anemia  Past medical history significant for vitamin D deficiency, thyroid disorder  Was recent CBC was completed on 09/23/2020 which showed hemoglobin of 8 1, MCV 78 1, platelet count 063, WBC 0 7, normal diff  TSH was low, 0 16  Vitamin D 14  ROS: Review of Systems   Constitutional: Positive for fatigue  Negative for appetite change, chills, fever and unexpected weight change  HENT: Negative for mouth sores, nosebleeds and trouble swallowing  Respiratory: Negative for cough and shortness of breath  Cardiovascular: Negative for chest pain, palpitations and leg swelling  Gastrointestinal: Negative for abdominal pain, blood in stool, constipation, diarrhea, nausea and vomiting  Denies dark or blood in stool    Genitourinary: Negative for difficulty urinating, dysuria, hematuria and menstrual problem (period is every month, 5 days in length, 3 days heavy changing pad/tampon every 3-4 hours )  Musculoskeletal: Negative for arthralgias  Skin: Negative  Neurological: Negative for dizziness, weakness, light-headedness, numbness and headaches  Hematological: Negative  Psychiatric/Behavioral: Negative        Past Medical History:   Diagnosis Date    Chlamydia     Disease of thyroid gland        Past Surgical History:   Procedure Laterality Date    [place] LAP,RMV  ADNEXAL STRUCTURE Bilateral 2017    Procedure: Micheal Cespedes;  Surgeon: Kamla Rivera MD;  Location: [facility];  Service: Gynecology       Social History     Socioeconomic History    Marital status: Single     Spouse name: None    Number of children: 11    Years of education: 12    Highest education level: None   Occupational History    Occupation: Medical billing   Social Needs    Financial resource strain: None    Food insecurity     Worry: None     Inability: None    Transportation needs     Medical: None     Non-medical: None   Tobacco Use    Smoking status: Current Every Day Smoker     Packs/day: 0 50     Types: Cigarettes    Smokeless tobacco: Never Used   Substance and Sexual Activity    Alcohol use: Yes     Frequency: 2-4 times a month     Drinks per session: 1 or 2    Drug use: Yes     Types: Marijuana     Comment: daily    Sexual activity: Yes     Partners: Male     Birth control/protection: Female Sterilization   Lifestyle    Physical activity     Days per week: None     Minutes per session: None    Stress: None   Relationships    Social connections     Talks on phone: None     Gets together: None     Attends Shinto service: None     Active member of club or organization: None     Attends meetings of clubs or organizations: None     Relationship status: None    Intimate partner violence     Fear of current or ex partner: None     Emotionally abused: None     Physically abused: None     Forced sexual activity: None   Other Topics Concern    None   Social History Narrative    · Most recent tobacco use screenin2019      · Do you currently or have you served in the [employer]:   No      · Were you activated, into active duty, as a member of the [employer] or as a Reservist:   No      · Sexual orientation:   Heterosexual      · Exercise level:   None      · Diet:   Regular      · General stress level:   Medium      · Caffeine intake: Moderate      · Illicit drugs:   none      · Live alone or with others:   with others      · Guns present in home:   No      · Single or multi-level home/work:   multi level home      · Seat belts used routinely:   Yes     · Sunscreen used routinely:   Yes       Smoke alarm in home: Yes      · Overweight:   Yes      · Advance directive:   No      · Obese: Yes      · Performs monthly self-breast exam:   No      · Legally blind in one or both eyes:   No      · Hard of hearing or deaf in one or both ears:   No     As per [employer]        Family History   Problem Relation Age of Onset    Hypertension Mother     Multiple sclerosis Mother     Hypothyroidism Mother     Diabetes Father     No Known Problems Sister     No Known Problems Daughter     No Known Problems Son     Breast cancer Maternal Grandmother     Diabetes Paternal Grandmother     Colon cancer Paternal Grandfather     No Known Problems Sister     No Known Problems Son     Autism Son     No Known Problems Son        No Known Allergies    No current outpatient medications on file  Physical Exam:  /66 (BP Location: Left arm, Patient Position: Sitting, Cuff Size: Adult)   Pulse 78   Temp 98 7 °F (37 1 °C)   Resp 18   Ht 5' 7" (1 702 m)   Wt 103 kg (226 lb)   SpO2 98%   BMI 35 40 kg/m²     Physical Exam  Vitals signs reviewed  Constitutional:       General: She is not in acute distress  Appearance: She is well-developed  She is obese  HENT:      Head: Normocephalic and atraumatic  Eyes:      General: No scleral icterus  Conjunctiva/sclera: Conjunctivae normal    Neck:      Musculoskeletal: Normal range of motion and neck supple  Cardiovascular:      Rate and Rhythm: Normal rate and regular rhythm  Heart sounds: Normal heart sounds  No murmur  Pulmonary:      Effort: Pulmonary effort is normal  No respiratory distress  Breath sounds: Normal breath sounds  Abdominal:      Palpations: Abdomen is soft  Tenderness: There is no abdominal tenderness  Musculoskeletal: Normal range of motion  General: No tenderness  Right lower leg: No edema  Left lower leg: No edema  Lymphadenopathy:      Cervical: No cervical adenopathy  Skin:     General: Skin is warm and dry  Neurological:      Mental Status: She is alert and oriented to person, place, and time  Cranial Nerves: No cranial nerve deficit  Psychiatric:         Mood and Affect: Mood normal          Behavior: Behavior normal        Labs:  Lab Results   Component Value Date    WBC 8 7 09/23/2020    HGB 8 1 (L) 09/23/2020    HCT 28 5 (L) 09/23/2020    MCV 78 1 (L) 09/23/2020     (H) 09/23/2020     Lab Results   Component Value Date    K 4 1 09/23/2020     09/23/2020    CO2 27 09/23/2020    BUN 10 09/23/2020    CREATININE 0 74 09/23/2020    CALCIUM 8 9 09/23/2020    AST 7 (L) 09/23/2020    ALT 6 09/23/2020    ALKPHOS 73 09/23/2020       Patient voiced understanding and agreement in the above discussion  Aware to contact our office with questions/symptoms in the interim  This note has been generated by voice recognition software system  Therefore, there may be spelling, grammar, and or syntax errors  Please contact if questions arise

## 2021-03-09 ENCOUNTER — TELEPHONE (OUTPATIENT)
Dept: OTHER | Facility: HOSPITAL | Age: 31
End: 2021-03-09

## 2021-03-09 ENCOUNTER — LAB (OUTPATIENT)
Dept: LAB | Facility: HOSPITAL | Age: 31
End: 2021-03-09
Attending: NURSE PRACTITIONER
Payer: COMMERCIAL

## 2021-03-09 ENCOUNTER — TELEPHONE (OUTPATIENT)
Dept: OTHER | Facility: OTHER | Age: 31
End: 2021-03-09

## 2021-03-09 DIAGNOSIS — L68.0 HIRSUTISM: ICD-10-CM

## 2021-03-09 DIAGNOSIS — Z11.3 SCREEN FOR STD (SEXUALLY TRANSMITTED DISEASE): ICD-10-CM

## 2021-03-09 DIAGNOSIS — E66.9 OBESITY (BMI 35.0-39.9 WITHOUT COMORBIDITY): ICD-10-CM

## 2021-03-09 DIAGNOSIS — E55.9 VITAMIN D DEFICIENCY: ICD-10-CM

## 2021-03-09 DIAGNOSIS — D50.8 OTHER IRON DEFICIENCY ANEMIA: ICD-10-CM

## 2021-03-09 DIAGNOSIS — R79.89 LOW SERUM THYROID STIMULATING HORMONE (TSH): ICD-10-CM

## 2021-03-09 LAB
25(OH)D3 SERPL-MCNC: 18 NG/ML (ref 30–100)
ANISOCYTOSIS BLD QL SMEAR: PRESENT
BASOPHILS # BLD AUTO: 0 THOUSANDS/ΜL (ref 0–0.1)
BASOPHILS NFR BLD AUTO: 0 % (ref 0–1)
BURR CELLS BLD QL SMEAR: PRESENT
CORTIS AM PEAK SERPL-MCNC: 1 UG/DL (ref 4.2–22.4)
EOSINOPHIL # BLD AUTO: 0 THOUSAND/ΜL (ref 0–0.4)
EOSINOPHIL NFR BLD AUTO: 0 % (ref 0–6)
ERYTHROCYTE [DISTWIDTH] IN BLOOD BY AUTOMATED COUNT: 17.9 %
FERRITIN SERPL-MCNC: 15 NG/ML (ref 8–388)
HBV SURFACE AG SER QL: NORMAL
HCT VFR BLD AUTO: 30.3 % (ref 36–46)
HCV AB SER QL: NORMAL
HGB BLD-MCNC: 9.2 G/DL (ref 12–16)
HYPERCHROMIA BLD QL SMEAR: PRESENT
IRON SATN MFR SERPL: 8 %
IRON SERPL-MCNC: 28 UG/DL (ref 50–170)
LYMPHOCYTES # BLD AUTO: 2.3 THOUSANDS/ΜL (ref 0.5–4)
LYMPHOCYTES NFR BLD AUTO: 27 % (ref 25–45)
MCH RBC QN AUTO: 23.4 PG (ref 26–34)
MCHC RBC AUTO-ENTMCNC: 30.2 G/DL (ref 31–36)
MCV RBC AUTO: 77 FL (ref 80–100)
MICROCYTES BLD QL AUTO: PRESENT
MONOCYTES # BLD AUTO: 0.4 THOUSAND/ΜL (ref 0.2–0.9)
MONOCYTES NFR BLD AUTO: 4 % (ref 1–10)
NEUTROPHILS # BLD AUTO: 6 THOUSANDS/ΜL (ref 1.8–7.8)
NEUTS SEG NFR BLD AUTO: 69 % (ref 45–65)
PLATELET # BLD AUTO: 432 THOUSANDS/UL (ref 150–450)
PLATELET BLD QL SMEAR: ABNORMAL
PMV BLD AUTO: 7.6 FL (ref 8.9–12.7)
POIKILOCYTOSIS BLD QL SMEAR: PRESENT
RBC # BLD AUTO: 3.91 MILLION/UL (ref 4–5.2)
RBC MORPH BLD: ABNORMAL
RPR SER QL: NORMAL
SCHISTOCYTES BLD QL SMEAR: PRESENT
T3 SERPL-MCNC: 1 NG/ML (ref 0.6–1.8)
T4 FREE SERPL-MCNC: 0.94 NG/DL (ref 0.76–1.46)
TIBC SERPL-MCNC: 354 UG/DL (ref 250–450)
TSH SERPL DL<=0.05 MIU/L-ACNC: 0.07 UIU/ML (ref 0.47–4.68)
WBC # BLD AUTO: 8.8 THOUSAND/UL (ref 4.5–11)

## 2021-03-09 PROCEDURE — 36415 COLL VENOUS BLD VENIPUNCTURE: CPT

## 2021-03-09 PROCEDURE — 82533 TOTAL CORTISOL: CPT

## 2021-03-09 PROCEDURE — 87389 HIV-1 AG W/HIV-1&-2 AB AG IA: CPT

## 2021-03-09 PROCEDURE — 83540 ASSAY OF IRON: CPT

## 2021-03-09 PROCEDURE — 87340 HEPATITIS B SURFACE AG IA: CPT

## 2021-03-09 PROCEDURE — 82728 ASSAY OF FERRITIN: CPT

## 2021-03-09 PROCEDURE — 84439 ASSAY OF FREE THYROXINE: CPT

## 2021-03-09 PROCEDURE — 84480 ASSAY TRIIODOTHYRONINE (T3): CPT

## 2021-03-09 PROCEDURE — 86592 SYPHILIS TEST NON-TREP QUAL: CPT

## 2021-03-09 PROCEDURE — 83498 ASY HYDROXYPROGESTERONE 17-D: CPT

## 2021-03-09 PROCEDURE — 85025 COMPLETE CBC W/AUTO DIFF WBC: CPT

## 2021-03-09 PROCEDURE — 82306 VITAMIN D 25 HYDROXY: CPT

## 2021-03-09 PROCEDURE — 83550 IRON BINDING TEST: CPT

## 2021-03-09 PROCEDURE — 84445 ASSAY OF TSI GLOBULIN: CPT

## 2021-03-09 PROCEDURE — 86803 HEPATITIS C AB TEST: CPT

## 2021-03-09 PROCEDURE — 84443 ASSAY THYROID STIM HORMONE: CPT

## 2021-03-10 LAB — HIV 1+2 AB+HIV1 P24 AG SERPL QL IA: NORMAL

## 2021-03-10 NOTE — TELEPHONE ENCOUNTER
I was called at 10 pm for a critical lab result of cortisol 1 0  This is a normal result for a Dexamethasone suppression test  Will call the patient tomorrow

## 2021-03-11 LAB — TSI SER-ACNC: <0.1 IU/L (ref 0–0.55)

## 2021-03-17 LAB — 17OHP SERPL-MCNC: 110 NG/DL

## 2021-03-18 ENCOUNTER — OFFICE VISIT (OUTPATIENT)
Dept: ENDOCRINOLOGY | Facility: CLINIC | Age: 31
End: 2021-03-18
Payer: COMMERCIAL

## 2021-03-18 VITALS
WEIGHT: 225.6 LBS | BODY MASS INDEX: 35.41 KG/M2 | HEIGHT: 67 IN | SYSTOLIC BLOOD PRESSURE: 132 MMHG | DIASTOLIC BLOOD PRESSURE: 80 MMHG | HEART RATE: 83 BPM

## 2021-03-18 DIAGNOSIS — E55.9 VITAMIN D DEFICIENCY: ICD-10-CM

## 2021-03-18 DIAGNOSIS — D50.0 IRON DEFICIENCY ANEMIA DUE TO CHRONIC BLOOD LOSS: Primary | ICD-10-CM

## 2021-03-18 DIAGNOSIS — L68.0 HIRSUTISM: ICD-10-CM

## 2021-03-18 DIAGNOSIS — R94.6 ABNORMAL THYROID FUNCTION TEST: ICD-10-CM

## 2021-03-18 PROCEDURE — 99214 OFFICE O/P EST MOD 30 MIN: CPT | Performed by: INTERNAL MEDICINE

## 2021-03-18 RX ORDER — SODIUM CHLORIDE 9 MG/ML
20 INJECTION, SOLUTION INTRAVENOUS ONCE
Status: CANCELLED | OUTPATIENT
Start: 2021-03-26

## 2021-03-18 RX ORDER — FERROUS SULFATE 325(65) MG
325 TABLET ORAL
COMMUNITY

## 2021-03-18 RX ORDER — ERGOCALCIFEROL (VITAMIN D2) 1250 MCG
50000 CAPSULE ORAL WEEKLY
Qty: 20 CAPSULE | Refills: 0 | Status: SHIPPED | OUTPATIENT
Start: 2021-03-18 | End: 2021-07-30

## 2021-03-18 NOTE — PROGRESS NOTES
Ventura Elliott 26 y o  female MRN: [id number]    Encounter: [id number]      Assessment/Plan     Assessment: This is a 26y o -year-old female with abnormal TSH, microcytic anemia with iron deficiency, vitamin D deficiency and hirsutism  Plan:    -Abnormal thyroid function tests:  Labs showed suppressed TSH with normal  Free T4 and T3  TSI are negative  Patient has nonspecific symptoms likely related to iron deficiency and vitamin-D deficiency  We will check for lab interference with TSH with HAMA and repeat free T4     -Vitamin D deficiency: Level: 18   Will start ergocalciferol 49346 units weekly for 20 doses than patient is instructed to take over-the-counter vitamin-D 5000 daily  -Iron deficiency anemia: iron level: 28   Patient has a long history of iron deficiency anemia and has recently seen Hematology  This could be due to chronic blood loss due to heavy menses  It could also be due to malabsorption such as with celiac disease especially with the association of vitamin-D deficiency  Will check for celiac disease panel  Patient has been taking iron supplementation for years with no change in his iron level  Will  Start IV iron with Venofer 200 mg twice weekly for 8 doses  -Hirsutism:  Testosterone, DHEA-S and 17 hydroxyprogesterone are all normal   Likely due to idiopathic hirsutism  - Abnormal weight gain:   Cushing's has been ruled out with normal dexamethasone suppression test   She is waiting for authorization for sleep study  CC:    Abnormal thyroid function tests    History of Present Illness     HPI:   patient is a 38-year-old female with abnormal thyroid function tests, hirsutism, fatigue and obesity who presents for follow-up  Patient still reports feeling tired all the time  Her weight has been stable lately  She reports heavy periods  She complains of nausea for the last month  She also reports chronic abdominal discomfort and diarrhea after eating   She denies dizziness  She is still waiting for authorization for sleep study        Review of Systems   Constitutional: Positive for fatigue  Negative for chills, fever and unexpected weight change  HENT: Negative for ear pain, sore throat, trouble swallowing and voice change  Eyes: Negative for pain and visual disturbance  Respiratory: Negative for cough and shortness of breath  Cardiovascular: Negative for chest pain and palpitations  Gastrointestinal: Positive for diarrhea and nausea  Negative for abdominal pain and vomiting  Endocrine: Negative for cold intolerance, heat intolerance, polydipsia, polyphagia and polyuria  Genitourinary: Negative for dysuria and hematuria  Musculoskeletal: Negative for arthralgias and back pain  Skin: Negative for color change and rash  Neurological: Negative for dizziness, seizures, syncope, light-headedness and numbness  Psychiatric/Behavioral: Negative for agitation, behavioral problems and confusion  All other systems reviewed and are negative        Historical Information   Past Medical History:   Diagnosis Date    Chlamydia 2008    Disease of thyroid gland     Vitamin D deficiency      Past Surgical History:   Procedure Laterality Date    [place] LAP,RMV  ADNEXAL STRUCTURE Bilateral 9/5/2017    Procedure: Mickeal Lash;  Surgeon: Chang Gold MD;  [facility];  Service: Gynecology     Social History   Social History     Substance and Sexual Activity   Alcohol Use Yes    Frequency: 2-4 times a month    Drinks per session: 1 or 2    Binge frequency: Never     Social History     Substance and Sexual Activity   Drug Use Yes    Types: Marijuana    Comment: daily     Social History     Tobacco Use   Smoking Status Current Every Day Smoker    Packs/day: 0 50    Types: Cigarettes   Smokeless Tobacco Never Used     Family History:   Family History   Problem Relation Age of Onset    Hypertension Mother     Multiple sclerosis Mother  Hypothyroidism Mother     Diabetes Father     No Known Problems Sister     No Known Problems Daughter     No Known Problems Son     Breast cancer Maternal Grandmother     Diabetes Paternal Grandmother     Colon cancer Paternal Grandfather     No Known Problems Sister     No Known Problems Son     Autism Son     No Known Problems Son        Meds/Allergies   Current Outpatient Medications   Medication Sig Dispense Refill    ferrous sulfate 325 (65 Fe) mg tablet Take 325 mg by mouth daily with breakfast       No current facility-administered medications for this visit  No Known Allergies    Objective   Vitals: Blood pressure 132/80, pulse 83, height 5' 7" (1 702 m), weight 102 kg (225 lb 9 6 oz), not currently breastfeeding  Physical Exam  Vitals signs reviewed  Constitutional:       General: She is not in acute distress  Appearance: Normal appearance  She is obese  HENT:      Head: Normocephalic and atraumatic  Nose: Nose normal       Mouth/Throat:      Mouth: Mucous membranes are moist    Eyes:      Extraocular Movements: Extraocular movements intact  Conjunctiva/sclera: Conjunctivae normal       Pupils: Pupils are equal, round, and reactive to light  Neck:      Musculoskeletal: Normal range of motion and neck supple  Comments:  No thyromegaly  Cardiovascular:      Rate and Rhythm: Normal rate and regular rhythm  Pulses: Normal pulses  Heart sounds: Normal heart sounds  Pulmonary:      Effort: Pulmonary effort is normal       Breath sounds: Normal breath sounds  No wheezing or rales  Abdominal:      General: Bowel sounds are normal       Palpations: Abdomen is soft  Musculoskeletal: Normal range of motion  General: No swelling  Right lower leg: No edema  Left lower leg: No edema  Lymphadenopathy:      Cervical: No cervical adenopathy  Skin:     General: Skin is warm  Neurological:      General: No focal deficit present  Mental Status: She is alert and oriented to person, place, and time  Deep Tendon Reflexes: Reflexes normal    Psychiatric:         Mood and Affect: Mood normal          Behavior: Behavior normal          Thought Content: Thought content normal          Judgment: Judgment normal          The history was obtained from the review of the chart, patient  Lab Results:   Lab Results   Component Value Date/Time    TSH 3RD GENERATON 0 071 (L) 03/09/2021 08:24 AM    Free T4 0 94 03/09/2021 08:24 AM    Free t4 1 0 09/23/2020 08:48 AM         Portions of the record may have been created with voice recognition software  Occasional wrong word or "sound a like" substitutions may have occurred due to the inherent limitations of voice recognition software  Read the chart carefully and recognize, using context, where substitutions have occurred

## 2021-03-26 ENCOUNTER — HOSPITAL ENCOUNTER (OUTPATIENT)
Dept: INFUSION CENTER | Facility: CLINIC | Age: 31
Discharge: HOME | End: 2021-03-26
Payer: COMMERCIAL

## 2021-03-26 VITALS — DIASTOLIC BLOOD PRESSURE: 77 MMHG | TEMPERATURE: 98.3 F | HEART RATE: 85 BPM | SYSTOLIC BLOOD PRESSURE: 121 MMHG

## 2021-03-26 DIAGNOSIS — D50.0 IRON DEFICIENCY ANEMIA DUE TO CHRONIC BLOOD LOSS: Primary | ICD-10-CM

## 2021-03-26 PROCEDURE — 96365 THER/PROPH/DIAG IV INF INIT: CPT

## 2021-03-26 RX ORDER — SODIUM CHLORIDE 9 MG/ML
20 INJECTION, SOLUTION INTRAVENOUS ONCE
Status: COMPLETED | OUTPATIENT
Start: 2021-03-26 | End: 2021-03-26

## 2021-03-26 RX ORDER — SODIUM CHLORIDE 9 MG/ML
20 INJECTION, SOLUTION INTRAVENOUS ONCE
Status: CANCELLED | OUTPATIENT
Start: 2021-03-28

## 2021-03-26 RX ADMIN — SODIUM CHLORIDE 20 ML/HR: 0.9 INJECTION, SOLUTION INTRAVENOUS at 10:57

## 2021-03-26 RX ADMIN — SODIUM CHLORIDE 200 MG: 9 INJECTION, SOLUTION INTRAVENOUS at 10:58

## 2021-03-26 NOTE — PROGRESS NOTES
Patient's intake summary completed, here for first venofer infusion  Tolerated infusion without noted adverse reactions  Documenting RN reviewed s/s of adverse reactions and when to call ordering physician or when to report to closest ER  Next appointment scheduled and AVS given  Patient left infusion center in baseline condition

## 2021-03-26 NOTE — PLAN OF CARE

## 2021-03-29 ENCOUNTER — TELEPHONE (OUTPATIENT)
Dept: GYNECOLOGIC ONCOLOGY | Facility: CLINIC | Age: 31
End: 2021-03-29

## 2021-03-29 ENCOUNTER — TELEPHONE (OUTPATIENT)
Dept: HEMATOLOGY ONCOLOGY | Facility: CLINIC | Age: 31
End: 2021-03-29

## 2021-03-29 ENCOUNTER — HOSPITAL ENCOUNTER (OUTPATIENT)
Dept: INFUSION CENTER | Facility: CLINIC | Age: 31
Discharge: HOME | End: 2021-03-29
Payer: COMMERCIAL

## 2021-03-29 VITALS
DIASTOLIC BLOOD PRESSURE: 68 MMHG | TEMPERATURE: 97.8 F | HEART RATE: 71 BPM | RESPIRATION RATE: 16 BRPM | SYSTOLIC BLOOD PRESSURE: 102 MMHG

## 2021-03-29 DIAGNOSIS — D50.8 OTHER IRON DEFICIENCY ANEMIA: Primary | ICD-10-CM

## 2021-03-29 DIAGNOSIS — D50.0 IRON DEFICIENCY ANEMIA DUE TO CHRONIC BLOOD LOSS: Primary | ICD-10-CM

## 2021-03-29 PROCEDURE — 96365 THER/PROPH/DIAG IV INF INIT: CPT

## 2021-03-29 RX ORDER — SODIUM CHLORIDE 9 MG/ML
20 INJECTION, SOLUTION INTRAVENOUS ONCE
Status: CANCELLED | OUTPATIENT
Start: 2021-04-02

## 2021-03-29 RX ORDER — SODIUM CHLORIDE 9 MG/ML
20 INJECTION, SOLUTION INTRAVENOUS ONCE
Status: COMPLETED | OUTPATIENT
Start: 2021-03-29 | End: 2021-03-29

## 2021-03-29 RX ADMIN — SODIUM CHLORIDE 200 MG: 9 INJECTION, SOLUTION INTRAVENOUS at 12:54

## 2021-03-29 RX ADMIN — SODIUM CHLORIDE 20 ML/HR: 0.9 INJECTION, SOLUTION INTRAVENOUS at 12:51

## 2021-03-29 NOTE — PLAN OF CARE
Problem: Potential for Falls  Goal: Patient will remain free of falls  Description: INTERVENTIONS:  - Assess patient frequently for physical needs  -  Identify cognitive and physical deficits and behaviors that affect risk of falls    -  [place] fall precautions as indicated by assessment   - Educate patient/family on patient safety including physical limitations  - Instruct patient to call for assistance with activity based on assessment  - Modify environment to reduce risk of injury  - Consider OT/PT consult to assist with strengthening/mobility  Outcome: Progressing

## 2021-03-29 NOTE — PROGRESS NOTES
Pt arrived to unit without complaint  Pt tolerated Venofer infusion without incident  AVS declined, but aware of future appts  Pt left unit in stable condition

## 2021-03-29 NOTE — TELEPHONE ENCOUNTER
Patient called to cancel appt 3/30, Will not be able to have labs completed  She requested to reschedule appointment until her iron infusions are completed 4/19/21  She is rescheduled at this moment for 4/21/21  Please call the patient back if this cannot be done/ needs to be seen sooner  She can be reached at [phone]

## 2021-03-29 NOTE — TELEPHONE ENCOUNTER
LVM instructing patient to have her labs completed prior to her appt  Patient was instructed to call the office if she has any questions

## 2021-04-01 ENCOUNTER — TELEPHONE (OUTPATIENT)
Dept: ENDOCRINOLOGY | Facility: CLINIC | Age: 31
End: 2021-04-01

## 2021-04-01 NOTE — TELEPHONE ENCOUNTER
Simone Wang from [employer] called and has a question about one of her medications needing an Adam Oven    [phone]

## 2021-04-02 ENCOUNTER — HOSPITAL ENCOUNTER (OUTPATIENT)
Dept: INFUSION CENTER | Facility: CLINIC | Age: 31
Discharge: HOME | End: 2021-04-02
Payer: COMMERCIAL

## 2021-04-02 VITALS
TEMPERATURE: 97.3 F | DIASTOLIC BLOOD PRESSURE: 75 MMHG | RESPIRATION RATE: 16 BRPM | SYSTOLIC BLOOD PRESSURE: 114 MMHG | HEART RATE: 78 BPM

## 2021-04-02 DIAGNOSIS — D50.0 IRON DEFICIENCY ANEMIA DUE TO CHRONIC BLOOD LOSS: Primary | ICD-10-CM

## 2021-04-02 PROCEDURE — 96365 THER/PROPH/DIAG IV INF INIT: CPT

## 2021-04-02 RX ORDER — SODIUM CHLORIDE 9 MG/ML
20 INJECTION, SOLUTION INTRAVENOUS ONCE
Status: CANCELLED | OUTPATIENT
Start: 2021-04-05

## 2021-04-02 RX ORDER — SODIUM CHLORIDE 9 MG/ML
20 INJECTION, SOLUTION INTRAVENOUS ONCE
Status: COMPLETED | OUTPATIENT
Start: 2021-04-02 | End: 2021-04-02

## 2021-04-02 RX ADMIN — SODIUM CHLORIDE 200 MG: 9 INJECTION, SOLUTION INTRAVENOUS at 12:17

## 2021-04-02 RX ADMIN — SODIUM CHLORIDE 20 ML/HR: 0.9 INJECTION, SOLUTION INTRAVENOUS at 12:17

## 2021-04-02 NOTE — TELEPHONE ENCOUNTER
Spoke with Filemon Ochoa from infusion and Venofer needs P A  due to amount 800 mg per month which exceed amount allow which is 600 mg per month    Submitted Drug:Venofer 20MG/ML solution through covermymeds Key: [address] [facility] for Oral [phone]) [id number]

## 2021-04-05 ENCOUNTER — HOSPITAL ENCOUNTER (OUTPATIENT)
Dept: INFUSION CENTER | Facility: CLINIC | Age: 31
Discharge: HOME | End: 2021-04-05
Payer: COMMERCIAL

## 2021-04-05 VITALS
HEART RATE: 67 BPM | TEMPERATURE: 98.5 F | RESPIRATION RATE: 18 BRPM | SYSTOLIC BLOOD PRESSURE: 103 MMHG | DIASTOLIC BLOOD PRESSURE: 69 MMHG

## 2021-04-05 DIAGNOSIS — D50.0 IRON DEFICIENCY ANEMIA DUE TO CHRONIC BLOOD LOSS: Primary | ICD-10-CM

## 2021-04-05 PROCEDURE — 96365 THER/PROPH/DIAG IV INF INIT: CPT

## 2021-04-05 RX ORDER — SODIUM CHLORIDE 9 MG/ML
20 INJECTION, SOLUTION INTRAVENOUS ONCE
Status: CANCELLED | OUTPATIENT
Start: 2021-04-09

## 2021-04-05 RX ORDER — SODIUM CHLORIDE 9 MG/ML
20 INJECTION, SOLUTION INTRAVENOUS ONCE
Status: COMPLETED | OUTPATIENT
Start: 2021-04-05 | End: 2021-04-05

## 2021-04-05 RX ADMIN — SODIUM CHLORIDE 200 MG: 9 INJECTION, SOLUTION INTRAVENOUS at 12:57

## 2021-04-05 RX ADMIN — SODIUM CHLORIDE 20 ML/HR: 0.9 INJECTION, SOLUTION INTRAVENOUS at 12:50

## 2021-04-05 NOTE — PLAN OF CARE
Problem: Potential for Falls  Goal: Patient will remain free of falls  Description: INTERVENTIONS:  - Assess patient frequently for physical needs  -  Identify cognitive and physical deficits and behaviors that affect risk of falls    -  [place] fall precautions as indicated by assessment   - Educate patient/family on patient safety including physical limitations  - Instruct patient to call for assistance with activity based on assessment  - Modify environment to reduce risk of injury  - Consider OT/PT consult to assist with strengthening/mobility  Outcome: Progressing Depth Of Tumor Invasion (For Histology): adipose tissue

## 2021-04-09 ENCOUNTER — HOSPITAL ENCOUNTER (OUTPATIENT)
Dept: INFUSION CENTER | Facility: CLINIC | Age: 31
Discharge: HOME | End: 2021-04-09
Payer: COMMERCIAL

## 2021-04-09 VITALS
RESPIRATION RATE: 16 BRPM | SYSTOLIC BLOOD PRESSURE: 100 MMHG | HEART RATE: 71 BPM | TEMPERATURE: 98.4 F | DIASTOLIC BLOOD PRESSURE: 67 MMHG

## 2021-04-09 DIAGNOSIS — D50.0 IRON DEFICIENCY ANEMIA DUE TO CHRONIC BLOOD LOSS: Primary | ICD-10-CM

## 2021-04-09 PROCEDURE — 96365 THER/PROPH/DIAG IV INF INIT: CPT

## 2021-04-09 RX ORDER — SODIUM CHLORIDE 9 MG/ML
20 INJECTION, SOLUTION INTRAVENOUS ONCE
Status: CANCELLED | OUTPATIENT
Start: 2021-04-12

## 2021-04-09 RX ORDER — SODIUM CHLORIDE 9 MG/ML
20 INJECTION, SOLUTION INTRAVENOUS ONCE
Status: COMPLETED | OUTPATIENT
Start: 2021-04-09 | End: 2021-04-09

## 2021-04-09 RX ADMIN — SODIUM CHLORIDE 20 ML/HR: 0.9 INJECTION, SOLUTION INTRAVENOUS at 12:16

## 2021-04-09 RX ADMIN — SODIUM CHLORIDE 200 MG: 9 INJECTION, SOLUTION INTRAVENOUS at 12:22

## 2021-04-09 NOTE — PROGRESS NOTES
Patient arrived on unit for Venofer  Denies any present issues/concerns  Tolerated infusion without incident  Aware of next appointment   Declined AVS

## 2021-04-12 ENCOUNTER — HOSPITAL ENCOUNTER (OUTPATIENT)
Dept: INFUSION CENTER | Facility: CLINIC | Age: 31
End: 2021-04-12

## 2021-04-16 ENCOUNTER — HOSPITAL ENCOUNTER (OUTPATIENT)
Dept: INFUSION CENTER | Facility: CLINIC | Age: 31
Discharge: HOME | End: 2021-04-16
Payer: COMMERCIAL

## 2021-04-16 VITALS
TEMPERATURE: 97.4 F | RESPIRATION RATE: 18 BRPM | HEART RATE: 63 BPM | DIASTOLIC BLOOD PRESSURE: 61 MMHG | SYSTOLIC BLOOD PRESSURE: 110 MMHG

## 2021-04-16 DIAGNOSIS — D50.0 IRON DEFICIENCY ANEMIA DUE TO CHRONIC BLOOD LOSS: Primary | ICD-10-CM

## 2021-04-16 PROCEDURE — 96365 THER/PROPH/DIAG IV INF INIT: CPT

## 2021-04-16 RX ORDER — SODIUM CHLORIDE 9 MG/ML
20 INJECTION, SOLUTION INTRAVENOUS ONCE
Status: CANCELLED | OUTPATIENT
Start: 2021-04-19

## 2021-04-16 RX ORDER — SODIUM CHLORIDE 9 MG/ML
20 INJECTION, SOLUTION INTRAVENOUS ONCE
Status: COMPLETED | OUTPATIENT
Start: 2021-04-16 | End: 2021-04-16

## 2021-04-16 RX ADMIN — SODIUM CHLORIDE 20 ML/HR: 0.9 INJECTION, SOLUTION INTRAVENOUS at 13:56

## 2021-04-16 RX ADMIN — SODIUM CHLORIDE 200 MG: 9 INJECTION, SOLUTION INTRAVENOUS at 13:57

## 2021-04-16 NOTE — PROGRESS NOTES
Patient arrived to the unit and denied complications with previous infusions  Tolerated venofer infusion well without adverse affects  Aware of future appointments

## 2021-04-16 NOTE — PLAN OF CARE
Problem: Potential for Falls  Goal: Patient will remain free of falls  Description: INTERVENTIONS:  - Assess patient frequently for physical needs  -  Identify cognitive and physical deficits and behaviors that affect risk of falls    -  [place] fall precautions as indicated by assessment   - Educate patient/family on patient safety including physical limitations  - Instruct patient to call for assistance with activity based on assessment  - Modify environment to reduce risk of injury  - Consider OT/PT consult to assist with strengthening/mobility  4/16/2021 1539 by Jinny Atkins RN  Outcome: Progressing  4/16/2021 1538 by Jinny Atkins RN  Outcome: Progressing

## 2021-04-19 ENCOUNTER — HOSPITAL ENCOUNTER (OUTPATIENT)
Dept: INFUSION CENTER | Facility: CLINIC | Age: 31
Discharge: HOME | End: 2021-04-19

## 2021-04-19 ENCOUNTER — TELEPHONE (OUTPATIENT)
Dept: HEMATOLOGY ONCOLOGY | Facility: CLINIC | Age: 31
End: 2021-04-19

## 2021-04-19 VITALS
HEART RATE: 72 BPM | SYSTOLIC BLOOD PRESSURE: 111 MMHG | TEMPERATURE: 97.7 F | RESPIRATION RATE: 18 BRPM | DIASTOLIC BLOOD PRESSURE: 73 MMHG

## 2021-04-19 DIAGNOSIS — D50.0 IRON DEFICIENCY ANEMIA DUE TO CHRONIC BLOOD LOSS: ICD-10-CM

## 2021-04-19 NOTE — PROGRESS NOTES
Six attempts made to obtain IV access made by 3 different RN's with no success  Patient declined further sticks and preferred to reschedule  Patient rescheduled for Wednesday, encouraged patient to drink more fluids before coming

## 2021-04-21 ENCOUNTER — HOSPITAL ENCOUNTER (OUTPATIENT)
Dept: INFUSION CENTER | Facility: CLINIC | Age: 31
Discharge: HOME | End: 2021-04-21
Payer: COMMERCIAL

## 2021-04-21 VITALS
HEART RATE: 78 BPM | TEMPERATURE: 97.5 F | DIASTOLIC BLOOD PRESSURE: 74 MMHG | RESPIRATION RATE: 18 BRPM | SYSTOLIC BLOOD PRESSURE: 114 MMHG

## 2021-04-21 DIAGNOSIS — D50.0 IRON DEFICIENCY ANEMIA DUE TO CHRONIC BLOOD LOSS: Primary | ICD-10-CM

## 2021-04-21 PROCEDURE — 96365 THER/PROPH/DIAG IV INF INIT: CPT

## 2021-04-21 RX ORDER — SODIUM CHLORIDE 9 MG/ML
20 INJECTION, SOLUTION INTRAVENOUS ONCE
Status: CANCELLED | OUTPATIENT
Start: 2021-04-23

## 2021-04-21 RX ORDER — SODIUM CHLORIDE 9 MG/ML
20 INJECTION, SOLUTION INTRAVENOUS ONCE
Status: COMPLETED | OUTPATIENT
Start: 2021-04-21 | End: 2021-04-21

## 2021-04-21 RX ADMIN — SODIUM CHLORIDE 20 ML/HR: 0.9 INJECTION, SOLUTION INTRAVENOUS at 10:56

## 2021-04-21 RX ADMIN — SODIUM CHLORIDE 200 MG: 9 INJECTION, SOLUTION INTRAVENOUS at 10:58

## 2021-04-21 NOTE — PROGRESS NOTES
Pt arrived to unit without complaint  Pt tolerated Venofer infusion without incident  AVS declined, but pt aware to return Friday for appt  Pt left unit in stable condition

## 2021-04-21 NOTE — PLAN OF CARE
Problem: Potential for Falls  Goal: Patient will remain free of falls  Description: INTERVENTIONS:  - Assess patient frequently for physical needs  -  Identify cognitive and physical deficits and behaviors that affect risk of falls    -  [place] fall precautions as indicated by assessment   - Educate patient/family on patient safety including physical limitations  - Instruct patient to call for assistance with activity based on assessment  - Modify environment to reduce risk of injury  - Consider OT/PT consult to assist with strengthening/mobility  Outcome: Progressing

## 2021-04-23 ENCOUNTER — HOSPITAL ENCOUNTER (OUTPATIENT)
Dept: INFUSION CENTER | Facility: CLINIC | Age: 31
Discharge: HOME | End: 2021-04-23
Payer: COMMERCIAL

## 2021-04-23 VITALS
RESPIRATION RATE: 20 BRPM | TEMPERATURE: 97.7 F | HEART RATE: 76 BPM | SYSTOLIC BLOOD PRESSURE: 102 MMHG | DIASTOLIC BLOOD PRESSURE: 62 MMHG

## 2021-04-23 DIAGNOSIS — D50.0 IRON DEFICIENCY ANEMIA DUE TO CHRONIC BLOOD LOSS: Primary | ICD-10-CM

## 2021-04-23 PROCEDURE — 96365 THER/PROPH/DIAG IV INF INIT: CPT

## 2021-04-23 RX ORDER — SODIUM CHLORIDE 9 MG/ML
20 INJECTION, SOLUTION INTRAVENOUS ONCE
Status: COMPLETED | OUTPATIENT
Start: 2021-04-23 | End: 2021-04-23

## 2021-04-23 RX ORDER — SODIUM CHLORIDE 9 MG/ML
20 INJECTION, SOLUTION INTRAVENOUS ONCE
Status: CANCELLED | OUTPATIENT
Start: 2021-04-23

## 2021-04-23 RX ADMIN — SODIUM CHLORIDE 20 ML/HR: 0.9 INJECTION, SOLUTION INTRAVENOUS at 12:28

## 2021-04-23 RX ADMIN — SODIUM CHLORIDE 200 MG: 9 INJECTION, SOLUTION INTRAVENOUS at 12:31

## 2021-04-23 NOTE — PLAN OF CARE
Problem: Potential for Falls  Goal: Patient will remain free of falls  Description: INTERVENTIONS:  - Assess patient frequently for physical needs  -  Identify cognitive and physical deficits and behaviors that affect risk of falls    -  [place] fall precautions as indicated by assessment   - Educate patient/family on patient safety including physical limitations  - Instruct patient to call for assistance with activity based on assessment  - Modify environment to reduce risk of injury  - Consider OT/PT consult to assist with strengthening/mobility  Outcome: Progressing

## 2021-04-23 NOTE — PROGRESS NOTES
Pt arrived to unit without complaint  Pt tolerated Venofer infusion without incident  AVS declined, pt has no more appts at infusion center at this time  Pt left unit in stable condition

## 2021-05-05 ENCOUNTER — APPOINTMENT (OUTPATIENT)
Dept: LAB | Facility: CLINIC | Age: 31
End: 2021-05-05
Payer: COMMERCIAL

## 2021-05-05 DIAGNOSIS — E66.9 OBESITY (BMI 35.0-39.9 WITHOUT COMORBIDITY): ICD-10-CM

## 2021-05-05 DIAGNOSIS — G47.30 SLEEP APNEA, UNSPECIFIED TYPE: ICD-10-CM

## 2021-05-05 DIAGNOSIS — D50.8 OTHER IRON DEFICIENCY ANEMIA: ICD-10-CM

## 2021-05-05 DIAGNOSIS — E61.1 IRON DEFICIENCY: ICD-10-CM

## 2021-05-05 DIAGNOSIS — R94.6 ABNORMAL THYROID FUNCTION TEST: ICD-10-CM

## 2021-05-05 DIAGNOSIS — E28.2 POLYCYSTIC OVARIAN SYNDROME: ICD-10-CM

## 2021-05-05 DIAGNOSIS — D50.0 IRON DEFICIENCY ANEMIA DUE TO CHRONIC BLOOD LOSS: ICD-10-CM

## 2021-05-05 LAB
ALBUMIN SERPL BCP-MCNC: 3.6 G/DL (ref 3.5–5)
ALP SERPL-CCNC: 94 U/L (ref 46–116)
ALT SERPL W P-5'-P-CCNC: 13 U/L (ref 12–78)
ANION GAP SERPL CALCULATED.3IONS-SCNC: 5 MMOL/L (ref 4–13)
AST SERPL W P-5'-P-CCNC: 7 U/L (ref 5–45)
BASOPHILS # BLD AUTO: 0.02 THOUSANDS/ΜL (ref 0–0.1)
BASOPHILS NFR BLD AUTO: 0 % (ref 0–1)
BILIRUB SERPL-MCNC: 0.19 MG/DL (ref 0.2–1)
BUN SERPL-MCNC: 10 MG/DL (ref 5–25)
CALCIUM SERPL-MCNC: 9.2 MG/DL (ref 8.3–10.1)
CHLORIDE SERPL-SCNC: 108 MMOL/L (ref 100–108)
CO2 SERPL-SCNC: 26 MMOL/L (ref 21–32)
CREAT SERPL-MCNC: 0.66 MG/DL (ref 0.6–1.3)
EOSINOPHIL # BLD AUTO: 0.11 THOUSAND/ΜL (ref 0–0.61)
EOSINOPHIL NFR BLD AUTO: 1 % (ref 0–6)
ERYTHROCYTE [DISTWIDTH] IN BLOOD BY AUTOMATED COUNT: 23.5 % (ref 11.6–15.1)
EST. AVERAGE GLUCOSE BLD GHB EST-MCNC: 108 MG/DL
FERRITIN SERPL-MCNC: 286 NG/ML (ref 8–388)
GFR SERPL CREATININE-BSD FRML MDRD: 135 ML/MIN/1.73SQ M
GLUCOSE P FAST SERPL-MCNC: 88 MG/DL (ref 65–99)
HBA1C MFR BLD: 5.4 %
HCT VFR BLD AUTO: 37.2 % (ref 34.8–46.1)
HGB BLD-MCNC: 11.1 G/DL (ref 11.5–15.4)
IMM GRANULOCYTES # BLD AUTO: 0.03 THOUSAND/UL (ref 0–0.2)
IMM GRANULOCYTES NFR BLD AUTO: 0 % (ref 0–2)
IRON SATN MFR SERPL: 20 %
IRON SERPL-MCNC: 52 UG/DL (ref 50–170)
LYMPHOCYTES # BLD AUTO: 2.9 THOUSANDS/ΜL (ref 0.6–4.47)
LYMPHOCYTES NFR BLD AUTO: 34 % (ref 14–44)
MCH RBC QN AUTO: 27.2 PG (ref 26.8–34.3)
MCHC RBC AUTO-ENTMCNC: 29.8 G/DL (ref 31.4–37.4)
MCV RBC AUTO: 91 FL (ref 82–98)
MONOCYTES # BLD AUTO: 0.45 THOUSAND/ΜL (ref 0.17–1.22)
MONOCYTES NFR BLD AUTO: 5 % (ref 4–12)
NEUTROPHILS # BLD AUTO: 5.01 THOUSANDS/ΜL (ref 1.85–7.62)
NEUTS SEG NFR BLD AUTO: 60 % (ref 43–75)
NRBC BLD AUTO-RTO: 0 /100 WBCS
PLATELET # BLD AUTO: 294 THOUSANDS/UL (ref 149–390)
PMV BLD AUTO: 10.4 FL (ref 8.9–12.7)
POTASSIUM SERPL-SCNC: 4.2 MMOL/L (ref 3.5–5.3)
PROT SERPL-MCNC: 7.9 G/DL (ref 6.4–8.2)
RBC # BLD AUTO: 4.08 MILLION/UL (ref 3.81–5.12)
SODIUM SERPL-SCNC: 139 MMOL/L (ref 136–145)
T4 FREE SERPL-MCNC: 0.98 NG/DL (ref 0.76–1.46)
TIBC SERPL-MCNC: 260 UG/DL (ref 250–450)
WBC # BLD AUTO: 8.52 THOUSAND/UL (ref 4.31–10.16)

## 2021-05-05 PROCEDURE — 83036 HEMOGLOBIN GLYCOSYLATED A1C: CPT

## 2021-05-05 PROCEDURE — 80053 COMPREHEN METABOLIC PANEL: CPT

## 2021-05-05 PROCEDURE — 83516 IMMUNOASSAY NONANTIBODY: CPT

## 2021-05-05 PROCEDURE — 36415 COLL VENOUS BLD VENIPUNCTURE: CPT

## 2021-05-05 PROCEDURE — 86255 FLUORESCENT ANTIBODY SCREEN: CPT

## 2021-05-05 PROCEDURE — 82784 ASSAY IGA/IGD/IGG/IGM EACH: CPT

## 2021-05-05 PROCEDURE — 82627 DEHYDROEPIANDROSTERONE: CPT

## 2021-05-05 PROCEDURE — 85025 COMPLETE CBC W/AUTO DIFF WBC: CPT

## 2021-05-05 PROCEDURE — 83550 IRON BINDING TEST: CPT

## 2021-05-05 PROCEDURE — 82728 ASSAY OF FERRITIN: CPT

## 2021-05-05 PROCEDURE — 83540 ASSAY OF IRON: CPT

## 2021-05-05 PROCEDURE — 84439 ASSAY OF FREE THYROXINE: CPT

## 2021-05-06 LAB
DHEA-S SERPL-MCNC: 148 UG/DL (ref 84.8–378)
ENDOMYSIUM IGA SER QL: NEGATIVE
GLIADIN PEPTIDE IGA SER-ACNC: 24 UNITS (ref 0–19)
GLIADIN PEPTIDE IGG SER-ACNC: 4 UNITS (ref 0–19)
IGA SERPL-MCNC: 212 MG/DL (ref 87–352)
TTG IGA SER-ACNC: <2 U/ML (ref 0–3)
TTG IGG SER-ACNC: <2 U/ML (ref 0–5)

## 2021-05-12 ENCOUNTER — OFFICE VISIT (OUTPATIENT)
Dept: HEMATOLOGY ONCOLOGY | Facility: CLINIC | Age: 31
End: 2021-05-12
Payer: COMMERCIAL

## 2021-05-12 VITALS
TEMPERATURE: 97.6 F | HEIGHT: 67 IN | RESPIRATION RATE: 18 BRPM | SYSTOLIC BLOOD PRESSURE: 112 MMHG | HEART RATE: 69 BPM | DIASTOLIC BLOOD PRESSURE: 66 MMHG | BODY MASS INDEX: 35.44 KG/M2 | OXYGEN SATURATION: 99 % | WEIGHT: 225.8 LBS

## 2021-05-12 DIAGNOSIS — D50.8 OTHER IRON DEFICIENCY ANEMIA: Primary | ICD-10-CM

## 2021-05-12 PROCEDURE — 99214 OFFICE O/P EST MOD 30 MIN: CPT | Performed by: PHYSICIAN ASSISTANT

## 2021-05-12 NOTE — PROGRESS NOTES
Hematology/Oncology Outpatient Follow-up  Jeff Bishop 26 y o  female 1990 [id number]    Date:  5/12/2021      Assessment and Plan:    1  Other iron deficiency anemia  26-year-old female presents for follow-up regarding history of iron deficiency anemia  The etiology of her iron deficiency is unclear  She responded well to IV iron  She tolerated IV iron well as well  She does not have menorrhagia  Recommend evaluation by GI  She did have laboratory testing for celiac which were borderline possibly positive  Recommend evaluation with GI as definitive diagnosis is with biopsy of the duodenum with will villous blunting on pathology  She does note some intolerance to foods that contain gluten  She also could initiate gluten free diet at this time  If she does have celiac disease or gluten intolerance this could lead to iron malabsorption  Will plan for monitoring labs in 6 months in follow-up at that time  Call sooner symptomatic  May obtain labs sooner if symptomatic and call to review  - Ambulatory referral to Gastroenterology; Future  - CBC and differential; Future  - Iron Panel (Includes Ferritin, Iron Sat%, Iron, and TIBC); Future    HPI:  26-year-old female presents for follow up regarding iron deficiency anemia      Past medical history significant for vitamin D deficiency, thyroid disorder       Most recent CBC was completed on 09/23/2020 which showed hemoglobin of 8 1, MCV 78 1, platelet count 107, WBC 0 7, normal diff      At consult in Jan TSH was low, 0 16  Vitamin D 14  She is seeing and being managed by endo for this  Interval history:  Period 5 days every 1 month; denies blood clots   She had lab testing for celiac; notices intolerance to foods with gluten     ROS: Review of Systems   Constitutional: Positive for fatigue  Negative for appetite change, chills and fever  HENT: Negative for mouth sores and nosebleeds  Respiratory: Negative for cough      Cardiovascular: Negative for chest pain, palpitations and leg swelling  Gastrointestinal: Negative for abdominal pain, blood in stool, constipation, diarrhea, nausea and vomiting  Denies dark stool   Genitourinary: Negative for difficulty urinating, dysuria and hematuria  Musculoskeletal: Negative for arthralgias  Neurological: Negative for dizziness, weakness, light-headedness, numbness and headaches  Hematological: Does not bruise/bleed easily         Past Medical History:   Diagnosis Date    Chlamydia 2008    Disease of thyroid gland     Vitamin D deficiency        Past Surgical History:   Procedure Laterality Date    MO LAP,RMV  ADNEXAL STRUCTURE Bilateral 9/5/2017    Procedure: Pam Elias;  Surgeon: Gianni Villagomez MD;  Location: [facility];  Service: Gynecology       Social History     Socioeconomic History    Marital status: Single     Spouse name: None    Number of children: 11    Years of education: 15    Highest education level: None   Occupational History    Occupation: Medical billing   Social Needs    Financial resource strain: None    Food insecurity     Worry: None     Inability: None    Transportation needs     Medical: None     Non-medical: None   Tobacco Use    Smoking status: Current Every Day Smoker     Packs/day: 0 50     Types: Cigarettes    Smokeless tobacco: Never Used   Substance and Sexual Activity    Alcohol use: Yes     Frequency: 2-4 times a month     Drinks per session: 1 or 2     Binge frequency: Never    Drug use: Yes     Types: Marijuana     Comment: daily    Sexual activity: Yes     Partners: Male     Birth control/protection: Female Sterilization   Lifestyle    Physical activity     Days per week: None     Minutes per session: None    Stress: None   Relationships    Social connections     Talks on phone: None     Gets together: None     Attends Latter day service: None     Active member of club or organization: None     Attends meetings of clubs or organizations: None     Relationship status: None    Intimate partner violence     Fear of current or ex partner: None     Emotionally abused: None     Physically abused: None     Forced sexual activity: None   Other Topics Concern    None   Social History Narrative    · Most recent tobacco use screenin2019      · Do you currently or have you served in the [employer]:   No      · Were you activated, into active duty, as a member of the [employer] or as a Reservist:   No      · Sexual orientation:   Heterosexual      · Exercise level:   None      · Diet:   Regular      · General stress level:   Medium      · Caffeine intake: Moderate      · Illicit drugs:   none      · Live alone or with others:   with others      · Guns present in home:   No      · Single or multi-level home/work:   multi level home      · Seat belts used routinely:   Yes     · Sunscreen used routinely:   Yes       Smoke alarm in home: Yes      · Overweight:   Yes      · Advance directive:   No      · Obese:    Yes      · Performs monthly self-breast exam:   No      · Legally blind in one or both eyes:   No      · Hard of hearing or deaf in one or both ears:   No     As per Karla Sanchez        Family History   Problem Relation Age of Onset    Hypertension Mother     Multiple sclerosis Mother     Hypothyroidism Mother     Diabetes Father     No Known Problems Sister     No Known Problems Daughter     No Known Problems Son     Breast cancer Maternal Grandmother     Diabetes Paternal Grandmother     Colon cancer Paternal Grandfather     No Known Problems Sister     No Known Problems Son     Autism Son     No Known Problems Son        No Known Allergies      Current Outpatient Medications:     ferrous sulfate 325 (65 Fe) mg tablet, Take 325 mg by mouth daily with breakfast, Disp: , Rfl:     VITAMIN D, ERGOCALCIFEROL, PO, Take 50,000 Int'l Units by mouth, Disp: , Rfl:     ergocalciferol (ERGOCALCIFEROL) 1 25 MG (31399 UT) capsule, Take 1 capsule (50,000 Units total) by mouth once a week for 20 doses (Patient not taking: Reported on 4/16/2021), Disp: 20 capsule, Rfl: 0      Physical Exam:  /66 (BP Location: Left arm, Patient Position: Sitting, Cuff Size: Adult)   Pulse 69   Temp 97 6 °F (36 4 °C) (Tympanic)   Resp 18   Ht 5' 7" (1 702 m)   Wt 102 kg (225 lb 12 8 oz)   SpO2 99%   BMI 35 37 kg/m²     Physical Exam  Vitals signs reviewed  Constitutional:       General: She is not in acute distress  Appearance: She is well-developed  HENT:      Head: Normocephalic and atraumatic  Eyes:      General: No scleral icterus  Conjunctiva/sclera: Conjunctivae normal    Neck:      Musculoskeletal: Normal range of motion and neck supple  Cardiovascular:      Rate and Rhythm: Normal rate and regular rhythm  Heart sounds: Normal heart sounds  No murmur  Pulmonary:      Effort: Pulmonary effort is normal  No respiratory distress  Breath sounds: Normal breath sounds  Abdominal:      Palpations: Abdomen is soft  Tenderness: There is no abdominal tenderness  Musculoskeletal: Normal range of motion  General: No tenderness  Right lower leg: No edema  Left lower leg: No edema  Lymphadenopathy:      Cervical: No cervical adenopathy  Skin:     General: Skin is warm and dry  Neurological:      Mental Status: She is alert and oriented to person, place, and time  Cranial Nerves: No cranial nerve deficit     Psychiatric:         Mood and Affect: Mood normal          Behavior: Behavior normal        Labs:  Lab Results   Component Value Date    WBC 8 52 05/05/2021    HGB 11 1 (L) 05/05/2021    HCT 37 2 05/05/2021    MCV 91 05/05/2021     05/05/2021     Lab Results   Component Value Date    K 4 2 05/05/2021     05/05/2021    CO2 26 05/05/2021    BUN 10 05/05/2021    CREATININE 0 66 05/05/2021    GLUF 88 05/05/2021    CALCIUM 9 2 05/05/2021    AST 7 05/05/2021    ALT 13 05/05/2021    ALKPHOS 94 05/05/2021    EGFR 135 05/05/2021       Patient voiced understanding and agreement in the above discussion  Aware to contact our office with questions/symptoms in the interim  This note has been generated by voice recognition software system  Therefore, there may be spelling, grammar, and or syntax errors  Please contact if questions arise

## 2021-08-19 ENCOUNTER — TELEPHONE (OUTPATIENT)
Dept: GASTROENTEROLOGY | Facility: MEDICAL CENTER | Age: 31
End: 2021-08-19

## 2021-11-15 ENCOUNTER — TELEPHONE (OUTPATIENT)
Dept: HEMATOLOGY ONCOLOGY | Facility: CLINIC | Age: 31
End: 2021-11-15

## 2021-11-16 ENCOUNTER — TELEPHONE (OUTPATIENT)
Dept: HEMATOLOGY ONCOLOGY | Facility: CLINIC | Age: 31
End: 2021-11-16

## 2022-12-18 ENCOUNTER — VBI (OUTPATIENT)
Dept: ADMINISTRATIVE | Facility: OTHER | Age: 32
End: 2022-12-18

## 2023-04-28 ENCOUNTER — OFFICE VISIT (OUTPATIENT)
Dept: ENDOCRINOLOGY | Facility: CLINIC | Age: 33
End: 2023-04-28

## 2023-04-28 VITALS
HEART RATE: 77 BPM | SYSTOLIC BLOOD PRESSURE: 102 MMHG | BODY MASS INDEX: 38 KG/M2 | DIASTOLIC BLOOD PRESSURE: 77 MMHG | HEIGHT: 67 IN | WEIGHT: 242.13 LBS

## 2023-04-28 DIAGNOSIS — D50.0 IRON DEFICIENCY ANEMIA DUE TO CHRONIC BLOOD LOSS: Primary | ICD-10-CM

## 2023-04-28 NOTE — PROGRESS NOTES
Lena Kim 27 y o  female MRN: [id number]    Encounter: [id number]        Established patient progress note    Impression:  Abnormal thyroid panel  Iron deficiency  Vitamin D deficiency    Assessment: This is a 27y o -year-old female with past medical history of iron deficiency, vitamin D deficiency  Presents to the clinic for a follow-up on abnormal thyroid panel results  Abnormal thyroid panel  TSH : 0 07 T4: 0 98 (2021)  During last visit in 2021, it was recommended to obtain HAMA TSH, due to her suppressed TSH and normal T4  However patient did not obtain the blood work  We will order a thyroid panel and will follow results  If results are normal she can continue following with her PCP  History of iron deficiency  Patient reports heavy menstrual cycles  In 2021 she received IV infusions of iron  She reports feeling much better after the infusion  Currently she reports that she is not taking any iron tablets  Will obtain CBC and iron panel  Then patient can continue following with her PCP    Vitamin D deficiency   Currently patient is not taking vitamin D, will obtain vitamin D levels    CC: Abnormal thyroid panel        HPI:  27years old female, past medical history of iron deficiency and vitamin D deficiency  Presents to the clinic for a follow-up  The patient reports fatigue, and denies other symptoms associated with hyperthyroidism such as palpitations, diarrhea, tremors  Patient does not take any over-the-counter supplements  Patient reports regular menstrual cycles    Review of Systems   Constitutional: Negative for activity change and appetite change  HENT: Negative for congestion and facial swelling  Eyes: Negative for photophobia and discharge  Respiratory: Negative for apnea, shortness of breath and wheezing  Cardiovascular: Negative for chest pain and palpitations  Gastrointestinal: Negative for abdominal distention and abdominal pain     Endocrine: Negative for cold intolerance, heat intolerance and polydipsia  Musculoskeletal: Negative for arthralgias and back pain  Skin: Negative for color change and wound  Neurological: Negative for tremors, weakness and headaches  Psychiatric/Behavioral: Negative for agitation, behavioral problems and confusion  Historical Information   Past Medical History:   Diagnosis Date   • Chlamydia 2008   • Disease of thyroid gland    • Vitamin D deficiency      Past Surgical History:   Procedure Laterality Date   • RI LAPAROSCOPY W/RMVL ADNEXAL STRUCTURES Bilateral 9/5/2017    Procedure: Dorie Ramsey;  Surgeon: Pro Estrella MD;  Location: [facility];  Service: Gynecology     Social History   Social History     Substance and Sexual Activity   Alcohol Use Not Currently     Social History     Substance and Sexual Activity   Drug Use Yes   • Types: Marijuana    Comment: daily     Social History     Tobacco Use   Smoking Status Every Day   • Packs/day: 0 50   • Years: 15 00   • Pack years: 7 50   • Types: Cigarettes   Smokeless Tobacco Never     Family History:   Family History   Problem Relation Age of Onset   • Hypertension Mother    • Multiple sclerosis Mother    • Hypothyroidism Mother    • Diabetes Father    • No Known Problems Sister    • No Known Problems Daughter    • No Known Problems Son    • Breast cancer Maternal Grandmother    • Diabetes Paternal Grandmother    • Colon cancer Paternal Grandfather    • No Known Problems Sister    • No Known Problems Son    • Autism Son    • No Known Problems Son        Meds/Allergies   Current Outpatient Medications   Medication Sig Dispense Refill   • ferrous sulfate 325 (65 Fe) mg tablet Take 325 mg by mouth daily with breakfast     • VITAMIN D, ERGOCALCIFEROL, PO Take 50,000 Int'l Units by mouth (Patient not taking: Reported on 4/28/2023)       No current facility-administered medications for this visit       No Known Allergies    Objective   Vitals: Blood pressure "102/77, pulse 77, height 5' 7\" (1 702 m), weight 110 kg (242 lb 2 oz), not currently breastfeeding  Physical Exam  Constitutional:       Appearance: Normal appearance  HENT:      Nose: No congestion or rhinorrhea  Mouth/Throat:      Pharynx: No oropharyngeal exudate or posterior oropharyngeal erythema  Eyes:      Conjunctiva/sclera: Conjunctivae normal       Pupils: Pupils are equal, round, and reactive to light  Cardiovascular:      Rate and Rhythm: Normal rate and regular rhythm  Pulses: Normal pulses  Heart sounds: No murmur heard  No friction rub  Pulmonary:      Effort: No respiratory distress  Breath sounds: No stridor  No wheezing  Abdominal:      General: There is no distension  Palpations: Abdomen is soft  Tenderness: There is no abdominal tenderness  Musculoskeletal:         General: No swelling  Cervical back: No rigidity or tenderness  Skin:     Coloration: Skin is not jaundiced  Findings: No bruising  Neurological:      General: No focal deficit present  Mental Status: She is alert and oriented to person, place, and time  Motor: No weakness  Psychiatric:         Mood and Affect: Mood normal          Thought Content: Thought content normal          Judgment: Judgment normal            Imaging Studies:   Results for orders placed in visit on 04/21/21    US thyroid      Portions of the record may have been created with voice recognition software  Occasional wrong word or \"sound a like\" substitutions may have occurred due to the inherent limitations of voice recognition software  Read the chart carefully and recognize, using context, where substitutions have occurred    "

## 2023-05-04 ENCOUNTER — APPOINTMENT (OUTPATIENT)
Dept: LAB | Age: 33
End: 2023-05-04

## 2023-05-04 DIAGNOSIS — D50.0 IRON DEFICIENCY ANEMIA DUE TO CHRONIC BLOOD LOSS: ICD-10-CM

## 2023-05-04 LAB
25(OH)D3 SERPL-MCNC: 18.4 NG/ML (ref 30–100)
ERYTHROCYTE [DISTWIDTH] IN BLOOD BY AUTOMATED COUNT: 16.1 % (ref 11.6–15.1)
FERRITIN SERPL-MCNC: 42 NG/ML (ref 8–388)
HCT VFR BLD AUTO: 33.7 % (ref 34.8–46.1)
HGB BLD-MCNC: 9.6 G/DL (ref 11.5–15.4)
IRON SATN MFR SERPL: 9 % (ref 15–50)
IRON SERPL-MCNC: 28 UG/DL (ref 50–170)
MCH RBC QN AUTO: 24.1 PG (ref 26.8–34.3)
MCHC RBC AUTO-ENTMCNC: 28.5 G/DL (ref 31.4–37.4)
MCV RBC AUTO: 85 FL (ref 82–98)
PLATELET # BLD AUTO: 443 THOUSANDS/UL (ref 149–390)
PMV BLD AUTO: 9.7 FL (ref 8.9–12.7)
RBC # BLD AUTO: 3.99 MILLION/UL (ref 3.81–5.12)
T4 FREE SERPL-MCNC: 0.94 NG/DL (ref 0.76–1.46)
TIBC SERPL-MCNC: 302 UG/DL (ref 250–450)
TSH SERPL DL<=0.05 MIU/L-ACNC: 0.22 UIU/ML (ref 0.45–4.5)
WBC # BLD AUTO: 6.07 THOUSAND/UL (ref 4.31–10.16)

## 2024-08-11 NOTE — PROGRESS NOTES
"Ambulatory Visit  Name: Sandra Milner      : 1990      MRN: [id number]  Encounter Provider: JARRETT Chung  Encounter Date: 2024   Encounter department: [facility]    Assessment & Plan   1. Menorrhagia with irregular cycle  -     CBC and differential; Future  -     Comprehensive metabolic panel; Future  -     TSH, 3rd generation with Free T4 reflex; Future  -     Prolactin; Future  -     US pelvis complete non OB; Future; Expected date: 2024  2. Irregular menses  -     Follicle stimulating hormone; Future    Pt to RTO for annual   History of Present Illness     Sandra Milner is a 34 y.o. female who presents with irregular bleeding, she is a new pt to us. She has hx of heavy menses. Hx of iron deficiency, abnormal thyroid, Vit D defiency. Hx of Venofer infusions.  She has been seen by endocrine in the past.  Hx of Depo use and Mirena- but bleed with both. Hx of tubal. \For the past 6 months gets menses- they skip for 1 month or 2. When she gets one lasts 5-6 days which is her normal. Heavy for 3 days, are more heavier changes pads every 2-3 hours.    Hx of HA's but always has HA's, no visual changes with HA's.  She does report her headaches have been getting stronger denies any nipple discharge. Has been taking iron 1 pill daily, but has fatigue. She denies any pelvic pain. Same sexual partner.  She denies any history of fibroids, or family history.  She does report intermittent hot flash.     Review of Systems  Pertinent Medical History         Objective     /80   Pulse 94   Ht 5' 7\" (1.702 m)   Wt 108 kg (237 lb 12.8 oz)   LMP 2024   BMI 37.24 kg/m²     Physical Exam  Constitutional:       Appearance: Normal appearance.   Cardiovascular:      Rate and Rhythm: Normal rate.   Pulmonary:      Effort: Pulmonary effort is normal.   Abdominal:      Palpations: Abdomen is soft.      Tenderness: There is no abdominal tenderness. "     External genitalia-no lesions or erythema  Vagina-normal white discharge  Cervix-negative CMT no lesions  Uterus-anteverted, nontender  Adnexa-no masses nontender    Administrative Statements

## 2024-08-12 ENCOUNTER — OFFICE VISIT (OUTPATIENT)
Dept: OBGYN CLINIC | Facility: CLINIC | Age: 34
End: 2024-08-12

## 2024-08-12 VITALS
WEIGHT: 237.8 LBS | DIASTOLIC BLOOD PRESSURE: 80 MMHG | BODY MASS INDEX: 37.32 KG/M2 | HEIGHT: 67 IN | SYSTOLIC BLOOD PRESSURE: 122 MMHG | HEART RATE: 94 BPM

## 2024-08-12 DIAGNOSIS — N92.1 MENORRHAGIA WITH IRREGULAR CYCLE: Primary | ICD-10-CM

## 2024-08-12 DIAGNOSIS — N92.6 IRREGULAR MENSES: ICD-10-CM

## 2024-08-12 PROCEDURE — 99203 OFFICE O/P NEW LOW 30 MIN: CPT | Performed by: NURSE PRACTITIONER

## 2024-08-12 RX ORDER — TOPIRAMATE 25 MG/1
25 TABLET, FILM COATED ORAL 2 TIMES DAILY
COMMUNITY
Start: 2024-07-22

## 2024-08-14 ENCOUNTER — APPOINTMENT (OUTPATIENT)
Dept: LAB | Age: 34
End: 2024-08-14
Payer: COMMERCIAL

## 2024-08-14 DIAGNOSIS — N92.1 MENORRHAGIA WITH IRREGULAR CYCLE: ICD-10-CM

## 2024-08-14 DIAGNOSIS — N92.6 IRREGULAR MENSES: ICD-10-CM

## 2024-08-14 LAB
ALBUMIN SERPL BCG-MCNC: 4 G/DL (ref 3.5–5)
ALP SERPL-CCNC: 83 U/L (ref 34–104)
ALT SERPL W P-5'-P-CCNC: 8 U/L (ref 7–52)
ANION GAP SERPL CALCULATED.3IONS-SCNC: 8 MMOL/L (ref 4–13)
AST SERPL W P-5'-P-CCNC: 9 U/L (ref 13–39)
BASOPHILS # BLD AUTO: 0.01 THOUSANDS/ÂΜL (ref 0–0.1)
BASOPHILS NFR BLD AUTO: 0 % (ref 0–1)
BILIRUB SERPL-MCNC: 0.31 MG/DL (ref 0.2–1)
BUN SERPL-MCNC: 10 MG/DL (ref 5–25)
CALCIUM SERPL-MCNC: 9.3 MG/DL (ref 8.4–10.2)
CHLORIDE SERPL-SCNC: 102 MMOL/L (ref 96–108)
CO2 SERPL-SCNC: 28 MMOL/L (ref 21–32)
CREAT SERPL-MCNC: 0.64 MG/DL (ref 0.6–1.3)
EOSINOPHIL # BLD AUTO: 0.13 THOUSAND/ÂΜL (ref 0–0.61)
EOSINOPHIL NFR BLD AUTO: 1 % (ref 0–6)
ERYTHROCYTE [DISTWIDTH] IN BLOOD BY AUTOMATED COUNT: 17.7 % (ref 11.6–15.1)
FSH SERPL-ACNC: 5.5 MIU/ML
GFR SERPL CREATININE-BSD FRML MDRD: 115 ML/MIN/1.73SQ M
GLUCOSE P FAST SERPL-MCNC: 94 MG/DL (ref 65–99)
HCT VFR BLD AUTO: 31.5 % (ref 34.8–46.1)
HGB BLD-MCNC: 9.3 G/DL (ref 11.5–15.4)
IMM GRANULOCYTES # BLD AUTO: 0.03 THOUSAND/UL (ref 0–0.2)
IMM GRANULOCYTES NFR BLD AUTO: 0 % (ref 0–2)
LYMPHOCYTES # BLD AUTO: 2.83 THOUSANDS/ÂΜL (ref 0.6–4.47)
LYMPHOCYTES NFR BLD AUTO: 31 % (ref 14–44)
MCH RBC QN AUTO: 24.3 PG (ref 26.8–34.3)
MCHC RBC AUTO-ENTMCNC: 29.5 G/DL (ref 31.4–37.4)
MCV RBC AUTO: 83 FL (ref 82–98)
MONOCYTES # BLD AUTO: 0.39 THOUSAND/ÂΜL (ref 0.17–1.22)
MONOCYTES NFR BLD AUTO: 4 % (ref 4–12)
NEUTROPHILS # BLD AUTO: 5.61 THOUSANDS/ÂΜL (ref 1.85–7.62)
NEUTS SEG NFR BLD AUTO: 64 % (ref 43–75)
NRBC BLD AUTO-RTO: 0 /100 WBCS
PLATELET # BLD AUTO: 432 THOUSANDS/UL (ref 149–390)
PMV BLD AUTO: 8.9 FL (ref 8.9–12.7)
POTASSIUM SERPL-SCNC: 4 MMOL/L (ref 3.5–5.3)
PROLACTIN SERPL-MCNC: 6.47 NG/ML (ref 3.34–26.72)
PROT SERPL-MCNC: 7.6 G/DL (ref 6.4–8.4)
RBC # BLD AUTO: 3.82 MILLION/UL (ref 3.81–5.12)
SODIUM SERPL-SCNC: 138 MMOL/L (ref 135–147)
T4 FREE SERPL-MCNC: 0.92 NG/DL (ref 0.61–1.12)
TSH SERPL DL<=0.05 MIU/L-ACNC: 0.18 UIU/ML (ref 0.45–4.5)
WBC # BLD AUTO: 9 THOUSAND/UL (ref 4.31–10.16)

## 2024-08-14 PROCEDURE — 36415 COLL VENOUS BLD VENIPUNCTURE: CPT

## 2024-08-14 PROCEDURE — 84146 ASSAY OF PROLACTIN: CPT

## 2024-08-14 PROCEDURE — 83001 ASSAY OF GONADOTROPIN (FSH): CPT

## 2024-08-14 PROCEDURE — 85025 COMPLETE CBC W/AUTO DIFF WBC: CPT

## 2024-08-14 PROCEDURE — 84443 ASSAY THYROID STIM HORMONE: CPT

## 2024-08-14 PROCEDURE — 80053 COMPREHEN METABOLIC PANEL: CPT

## 2024-08-14 PROCEDURE — 84439 ASSAY OF FREE THYROXINE: CPT

## 2024-08-15 ENCOUNTER — HOSPITAL ENCOUNTER (OUTPATIENT)
Dept: ULTRASOUND IMAGING | Facility: HOSPITAL | Age: 34
End: 2024-08-15
Payer: COMMERCIAL

## 2024-08-15 DIAGNOSIS — N92.1 MENORRHAGIA WITH IRREGULAR CYCLE: ICD-10-CM

## 2024-08-15 PROCEDURE — 76856 US EXAM PELVIC COMPLETE: CPT

## 2024-08-15 PROCEDURE — 76830 TRANSVAGINAL US NON-OB: CPT

## 2024-08-21 ENCOUNTER — TELEPHONE (OUTPATIENT)
Dept: OBGYN CLINIC | Facility: CLINIC | Age: 34
End: 2024-08-21

## 2024-08-21 NOTE — TELEPHONE ENCOUNTER
Informed pt of all results and recommendations. Pt verbalized understanding and scheduled annual exam.

## 2024-08-21 NOTE — TELEPHONE ENCOUNTER
----- Message from JARRETT Cruz sent at 8/20/2024  5:45 PM EDT -----  Please  inform Sandra that her thyroid test was abnormal, she is anemic and did need IV iron infusions in the past-  recommend for her to follow-up with her PCP.  Her FSH hormone level was normal, not menopausal and her prolactin level was normal.  Do recommend for her to follow-up for her menses and her annual/pap, her pelvic ultrasound was normal.  Thank you

## 2024-09-10 ENCOUNTER — ANNUAL EXAM (OUTPATIENT)
Dept: OBGYN CLINIC | Facility: CLINIC | Age: 34
End: 2024-09-10

## 2024-09-10 VITALS
DIASTOLIC BLOOD PRESSURE: 73 MMHG | HEIGHT: 67 IN | WEIGHT: 241 LBS | BODY MASS INDEX: 37.83 KG/M2 | SYSTOLIC BLOOD PRESSURE: 108 MMHG | HEART RATE: 78 BPM | RESPIRATION RATE: 18 BRPM

## 2024-09-10 DIAGNOSIS — Z20.2 POSSIBLE EXPOSURE TO STD: ICD-10-CM

## 2024-09-10 DIAGNOSIS — N92.1 MENORRHAGIA WITH IRREGULAR CYCLE: ICD-10-CM

## 2024-09-10 DIAGNOSIS — Z01.411 ENCOUNTER FOR GYNECOLOGICAL EXAMINATION WITH ABNORMAL FINDING: Primary | ICD-10-CM

## 2024-09-10 PROCEDURE — 99395 PREV VISIT EST AGE 18-39: CPT | Performed by: NURSE PRACTITIONER

## 2024-09-10 PROCEDURE — G0145 SCR C/V CYTO,THINLAYER,RESCR: HCPCS | Performed by: NURSE PRACTITIONER

## 2024-09-10 PROCEDURE — 87491 CHLMYD TRACH DNA AMP PROBE: CPT | Performed by: NURSE PRACTITIONER

## 2024-09-10 PROCEDURE — G0476 HPV COMBO ASSAY CA SCREEN: HCPCS | Performed by: NURSE PRACTITIONER

## 2024-09-10 PROCEDURE — 87591 N.GONORRHOEAE DNA AMP PROB: CPT | Performed by: NURSE PRACTITIONER

## 2024-09-10 NOTE — PROGRESS NOTES
ASSESSMENT & PLAN: Sandra Milner is a 34 y.o.  with abnormal gynecologic exam.  Menorrhagia with irregular cycle    1.  Routine well woman exam done today  2.  Pap and HPV:  The patient's last pap and hpv was 2019.    It was normal.    Pap and cotesting was done today.    Current ASCCP Guidelines reviewed.   3.  The following were reviewed in today's visit: breast self exam, STD testing, adequate intake of calcium and vitamin D, exercise, and healthy diet.  She desires STD testing today including blood work  4. Gardisil vaccine in women up to age 45 discussed and information was provided.  Patient will decide if she wants to get vaccine  5.  Menorrhagia with irregular cycle-patient to return to office for endometrial biopsy.  We briefly discussed treatment for heavy menses including contraception such as Mirena IUD, TXA,  ablation, hysterectomy.  Patient is considering Mirena IUD. Pt uses tobacco- has been cutting down  6.  She is to follow-up with her PCP for her abnormal  thyroid test and her anemia.  Continue with iron supplements.    BMI Counseling: Body mass index is 37.75 kg/m². The BMI is above normal. Nutrition recommendations include encouraging healthy choices of fruits and vegetables and moderation in carbohydrate intake. Exercise recommendations include exercising 3-5 times per week. No pharmacotherapy was ordered. Rationale for BMI follow-up plan is due to patient being overweight or obese.     Depression Screening and Follow-up Plan: Patient was screened for depression during today's encounter. They screened negative with a PHQ-2 score of 0.    Tobacco Cessation Counseling: Tobacco cessation counseling was not provided. The patient is sincerely urged to quit consumption of tobacco. She is ready to quit tobacco. She declines assistance with this, she feels she can quit on her own. The numerous health risks of tobacco consumption were discussed.       CC:  Annual Gynecologic  Examination    HPI: Sandra Milner is a 34 y.o.  who presents for annual gynecologic examination.  She was seen for heavy menses 2024, CBC CMP, TSH, prolactin, FSH and ultrasound were ordered .  Ultrasound on 8/15/2024 that was normal.  Her TSH was low free T4 was normal she was to follow-up with her PCP.  Hx of abnormal thyroid, FSH was normal, prolactin normal.  Her CBC showed hemoglobin of 9.3 has had IV Venofer infusions in the past   Hx of tubal.   Hx of Depo use and Mirena use  but did not like bleeding pattern.   The past 6 months gets menses, they skip for 1-2 months. When she gets one  last 5 to 6 days, menses  heavy for 3 days-changes her pads every 2-3 hours- wears overnight pads.         Health Maintenance:    She wears her seatbelt routinely.    She does perform regular monthly self breast exams.    She feels safe at home.     Past Medical History:   Diagnosis Date    Chlamydia     Disease of thyroid gland     Vitamin D deficiency        Past Surgical History:   Procedure Laterality Date    [place] LAPAROSCOPY W/RMVL ADNEXAL STRUCTURES Bilateral 2017    Procedure: LAPAROSCOPIC SALPINGECOTMY;  Surgeon: Antonio Salguero MD;  Location: [facility];  Service: Gynecology       Past OB/Gyn History:  OB History          5    Para   5    Term   5       0    AB   0    Living   5         SAB   0    IAB   0    Ectopic   0    Multiple        Live Births   5               Pt has menstrual issues. As per hPI   History of sexually transmitted infection: No.  History of abnormal pap smears: No .    Patient is currently sexually active.  heterosexual.  The current method of family planning is tubal ligation.    Family History   Problem Relation Age of Onset    Hypertension Mother     Multiple sclerosis Mother     Hypothyroidism Mother     HIV Mother     Diabetes Father     No Known Problems Sister     No Known Problems Sister     No Known Problems Daughter     No Known Problems Son      No Known Problems Son     Autism Son     No Known Problems Son     Breast cancer Maternal Grandmother     Diabetes Paternal Grandmother     Colon cancer Paternal Grandfather     Ovarian cancer Neg Hx        Social History:  Social History     Socioeconomic History    Marital status: Single     Spouse name: Not on file    Number of children: 5    Years of education: 12    Highest education level: Not on file   Occupational History    Occupation: Medical billing   Tobacco Use    Smoking status: Every Day     Current packs/day: 0.50     Average packs/day: 0.5 packs/day for 15.0 years (7.5 ttl pk-yrs)     Types: Cigarettes    Smokeless tobacco: Never   Vaping Use    Vaping status: Never Used   Substance and Sexual Activity    Alcohol use: Not Currently    Drug use: Yes     Types: Marijuana     Comment: daily    Sexual activity: Yes     Partners: Male     Birth control/protection: Female Sterilization   Other Topics Concern    Not on file   Social History Narrative    · Most recent tobacco use screenin2019      · Do you currently or have you served in the "[employer]":   No      · Were you activated, into active duty, as a member of the [employer] or as a Reservist:   No      · Sexual orientation:   Heterosexual      · Exercise level:   None      · Diet:   Regular      · General stress level:   Medium      · Caffeine intake:   Moderate      · Illicit drugs:   none      · Live alone or with others:   with others      · Guns present in home:   No      · Single or multi-level home/work:   multi level home      · Seat belts used routinely:   Yes     · Sunscreen used routinely:   Yes       Smoke alarm in home:   Yes      · Overweight:   Yes      · Advance directive:   No      · Obese:   Yes      · Performs monthly self-breast exam:   No      · Legally blind in one or both eyes:   No      · Hard of hearing or deaf in one or both ears:   No     As per Martha      Social Determinants of Health     Financial  "Resource Strain: Low Risk  (8/6/2024)    Overall Financial Resource Strain (CARDIA)     Difficulty of Paying Living Expenses: Not hard at all   Food Insecurity: No Food Insecurity (8/6/2024)    Hunger Vital Sign     Worried About Running Out of Food in the Last Year: Never true     Ran Out of Food in the Last Year: Never true   Transportation Needs: No Transportation Needs (8/6/2024)    PRAPARE - Transportation     Lack of Transportation (Medical): No     Lack of Transportation (Non-Medical): No   Physical Activity: Not on file   Stress: Not on file   Social Connections: Not on file   Intimate Partner Violence: Not At Risk (8/12/2024)    Humiliation, Afraid, Rape, and Kick questionnaire     Fear of Current or Ex-Partner: No     Emotionally Abused: No     Physically Abused: No     Sexually Abused: No   Housing Stability: Low Risk  (8/6/2024)    Housing Stability Vital Sign     Unable to Pay for Housing in the Last Year: No     Number of Times Moved in the Last Year: 0     Homeless in the Last Year: No     Presently lives with family.  Patient is single.  Patient is currently employed     No Known Allergies      Current Outpatient Medications:     ferrous sulfate 325 (65 Fe) mg tablet, Take 325 mg by mouth daily with breakfast \"Almost every day\", Disp: , Rfl:     topiramate (TOPAMAX) 25 mg tablet, Take 25 mg by mouth 2 (two) times a day (Patient not taking: Reported on 8/12/2024), Disp: , Rfl:     VITAMIN D, ERGOCALCIFEROL, PO, Take 50,000 Int'l Units by mouth \"Almost every day\", Disp: , Rfl:       Review of Systems  Constitutional :no fever, feels well, no tiredness, no recent weight gain or loss  ENT: no ear ache, no loss of hearing, no nosebleeds or nasal discharge, no sore throat or hoarseness.  Cardiovascular: no complaints of slow or fast heart beat, no chest pain, no palpitations, no leg claudication or lower extremity edema.  Respiratory: no complaints of shortness of shortness of breath, no WORKMAN  Breasts:no " "complaints of breast pain, breast lump, or nipple discharge  Gastrointestinal: no complaints of abdominal pain, constipation, nausea, vomiting, or diarrhea or bloody stools  Genitourinary : no complaints of dysuria, incontinence, pelvic pain, no dysmenorrhea, vaginal discharge or abnormal vaginal bleeding and as noted in HPI.  Musculoskeletal: no complaints of arthralgia, no myalgia, no joint swelling or stiffness, no limb pain or swelling.  Integumentary: no complaints of skin rash or lesion, itching or dry skin  Neurological: no complaints of headache, no confusion, no numbness or tingling, no dizziness or fainting    Objective      /73 (BP Location: Right arm, Patient Position: Sitting, Cuff Size: Adult)   Pulse 78   Resp 18   Ht 5' 7\" (1.702 m)   Wt 109 kg (241 lb)   LMP 08/10/2024 (Approximate)   BMI 37.75 kg/m²   General:   appears stated age, cooperative, alert normal mood and affect   Neck: normal, supple,trachea midline, no masses   Heart: regular rate and rhythm, S1, S2 normal, no murmur, click, rub or gallop   Lungs: clear to auscultation bilaterally   Breasts: normal appearance, no masses or tenderness, Inspection negative, No nipple retraction or dimpling, No nipple discharge or bleeding, No axillary or supraclavicular adenopathy, Normal to palpation without dominant masses, Taught monthly breast self examination   Abdomen: soft, non-tender, without masses or organomegaly   Vulva: normal female genitalia   Vagina: normal vagina, no discharge, exudate, lesion, or erythema   Urethra: normal   Cervix: Normal, no discharge. PAP done. GCC done. Nontender.   Uterus: normal size, contour, position, consistency, mobility, non-tender and anteverted   Adnexa: normal adnexa   Lymphatic palpation of lymph nodes in neck, axilla, groin and/or other locations: no lymphadenopathy or masses noted   Skin normal skin turgor and no rashes.   Psychiatric orientation to person, place, and time: normal. mood and " affect: normal

## 2024-09-12 LAB
C TRACH DNA SPEC QL NAA+PROBE: NEGATIVE
HPV HR 12 DNA CVX QL NAA+PROBE: NEGATIVE
HPV16 DNA CVX QL NAA+PROBE: NEGATIVE
HPV18 DNA CVX QL NAA+PROBE: NEGATIVE
N GONORRHOEA DNA SPEC QL NAA+PROBE: NEGATIVE

## 2024-09-13 ENCOUNTER — TELEPHONE (OUTPATIENT)
Dept: OBGYN CLINIC | Facility: CLINIC | Age: 34
End: 2024-09-13

## 2024-09-13 NOTE — TELEPHONE ENCOUNTER
----- Message from JARRETT Cruz sent at 9/12/2024  4:59 PM EDT -----  Please inform pt that her culture for chlamydia and gonorrhea were negative.  Thank You!

## 2024-09-16 LAB
LAB AP GYN PRIMARY INTERPRETATION: NORMAL
Lab: NORMAL
PATH INTERP SPEC-IMP: NORMAL

## 2024-09-17 ENCOUNTER — PROCEDURE VISIT (OUTPATIENT)
Dept: OBGYN CLINIC | Facility: CLINIC | Age: 34
End: 2024-09-17

## 2024-09-17 VITALS
HEIGHT: 67 IN | HEART RATE: 88 BPM | SYSTOLIC BLOOD PRESSURE: 117 MMHG | WEIGHT: 238 LBS | BODY MASS INDEX: 37.35 KG/M2 | RESPIRATION RATE: 18 BRPM | DIASTOLIC BLOOD PRESSURE: 75 MMHG

## 2024-09-17 DIAGNOSIS — Z32.02 PREGNANCY TEST NEGATIVE: ICD-10-CM

## 2024-09-17 DIAGNOSIS — N93.9 ABNORMAL UTERINE BLEEDING (AUB): Primary | ICD-10-CM

## 2024-09-17 LAB — SL AMB POCT URINE HCG: NORMAL

## 2024-09-17 PROCEDURE — 81025 URINE PREGNANCY TEST: CPT | Performed by: NURSE PRACTITIONER

## 2024-09-17 PROCEDURE — 88305 TISSUE EXAM BY PATHOLOGIST: CPT | Performed by: STUDENT IN AN ORGANIZED HEALTH CARE EDUCATION/TRAINING PROGRAM

## 2024-09-17 PROCEDURE — 58100 BIOPSY OF UTERUS LINING: CPT | Performed by: NURSE PRACTITIONER

## 2024-09-17 NOTE — PROGRESS NOTES
Endometrial biopsy    Date/Time: 9/17/2024 1:30 PM    Performed by: JARRETT Cruz  Authorized by: JARRETT Cruz  Universal Protocol:  procedure performed by consultantConsent: Verbal consent obtained. Written consent obtained.  Risks and benefits discussed: Reviewed risk of pain, infection, bleeding, risk of perforation 1/thousand, risk of repeat procedure and patient accepts these risks.  Consent given by: patient  Timeout called at: 9/17/2024 1:54 PM.  Patient understanding: patient states understanding of the procedure being performed  Patient consent: the patient's understanding of the procedure matches consent given  Procedure consent: procedure consent matches procedure scheduled  Relevant documents: relevant documents present and verified  Test results: test results available and properly labeled (negative UPT)  Patient identity confirmed: verbally with patient    Indication:     Indications: Other disorder of menstruation and other abnormal bleeding from female genital tract    Pre-procedure:     Premeds:  Ibuprofen (pt took at home prior to appt.)  Procedure:     A bivalve speculum was placed in the vagina: yes      Cervix cleaned and prepped: yes      A paracervical block was performed: no      An intracervical block was performed: no      The cervix was dilated: no      Uterus sounded: yes      Uterus sound depth (cm):  9    Specimen collected: specimen collected and sent to pathology      Specimen collected comment:  2 passes made, good sample obtained    Patient tolerated procedure well with no complications: yes    Findings:     Uterus size:  Non-gravid    Cervix: normal      Adnexa: normal    Comments:     Procedure comments:  Pt tolerated well.

## 2024-09-19 PROCEDURE — 88305 TISSUE EXAM BY PATHOLOGIST: CPT | Performed by: STUDENT IN AN ORGANIZED HEALTH CARE EDUCATION/TRAINING PROGRAM

## 2024-09-24 ENCOUNTER — TELEPHONE (OUTPATIENT)
Dept: OBGYN CLINIC | Facility: CLINIC | Age: 34
End: 2024-09-24

## 2024-09-24 NOTE — TELEPHONE ENCOUNTER
----- Message from JARRETT Cruz sent at 9/23/2024  8:33 PM EDT -----  Please inform Sandra that her endometrial bx benign proliferative phase usually related to annovulation, it was negative for cancer. Recommend for her to RTO for treatment discussion for her bleeding, she was considering Mirena IUD.  Thanks!

## 2024-09-24 NOTE — TELEPHONE ENCOUNTER
Message left for patient to call office to review test results and to schedule follow up appt to discuss plan of care

## 2024-09-26 ENCOUNTER — TELEPHONE (OUTPATIENT)
Dept: OBGYN CLINIC | Facility: CLINIC | Age: 34
End: 2024-09-26

## 2024-12-04 ENCOUNTER — HOSPITAL ENCOUNTER (EMERGENCY)
Facility: HOSPITAL | Age: 34
Discharge: HOME | End: 2024-12-04
Attending: EMERGENCY MEDICINE
Payer: COMMERCIAL

## 2024-12-04 VITALS
OXYGEN SATURATION: 97 % | RESPIRATION RATE: 18 BRPM | TEMPERATURE: 98.1 F | DIASTOLIC BLOOD PRESSURE: 102 MMHG | HEART RATE: 78 BPM | SYSTOLIC BLOOD PRESSURE: 163 MMHG

## 2024-12-04 DIAGNOSIS — Z20.2 EXPOSURE TO STD: Primary | ICD-10-CM

## 2024-12-04 PROCEDURE — 87389 HIV-1 AG W/HIV-1&-2 AB AG IA: CPT | Performed by: EMERGENCY MEDICINE

## 2024-12-04 PROCEDURE — 87491 CHLMYD TRACH DNA AMP PROBE: CPT | Performed by: EMERGENCY MEDICINE

## 2024-12-04 PROCEDURE — 96372 THER/PROPH/DIAG INJ SC/IM: CPT

## 2024-12-04 PROCEDURE — 36415 COLL VENOUS BLD VENIPUNCTURE: CPT | Performed by: EMERGENCY MEDICINE

## 2024-12-04 PROCEDURE — 99283 EMERGENCY DEPT VISIT LOW MDM: CPT

## 2024-12-04 PROCEDURE — 99284 EMERGENCY DEPT VISIT MOD MDM: CPT | Performed by: EMERGENCY MEDICINE

## 2024-12-04 PROCEDURE — 87591 N.GONORRHOEAE DNA AMP PROB: CPT | Performed by: EMERGENCY MEDICINE

## 2024-12-04 PROCEDURE — 86780 TREPONEMA PALLIDUM: CPT | Performed by: EMERGENCY MEDICINE

## 2024-12-04 RX ORDER — DOXYCYCLINE 100 MG/1
100 CAPSULE ORAL 2 TIMES DAILY
Qty: 14 CAPSULE | Refills: 0 | Status: SHIPPED | OUTPATIENT
Start: 2024-12-04 | End: 2024-12-11

## 2024-12-04 RX ORDER — DOXYCYCLINE 100 MG/1
100 CAPSULE ORAL ONCE
Status: COMPLETED | OUTPATIENT
Start: 2024-12-04 | End: 2024-12-04

## 2024-12-04 RX ADMIN — LIDOCAINE HYDROCHLORIDE 500 MG: 10 INJECTION, SOLUTION EPIDURAL; INFILTRATION; INTRACAUDAL; PERINEURAL at 19:14

## 2024-12-04 RX ADMIN — DOXYCYCLINE HYCLATE 100 MG: 100 CAPSULE ORAL at 19:14

## 2024-12-05 LAB
C TRACH DNA SPEC QL NAA+PROBE: NEGATIVE
HIV 1+2 AB+HIV1 P24 AG SERPL QL IA: NORMAL
HIV 2 AB SERPL QL IA: NORMAL
HIV1 AB SERPL QL IA: NORMAL
HIV1 P24 AG SERPL QL IA: NORMAL
N GONORRHOEA DNA SPEC QL NAA+PROBE: NEGATIVE
TREPONEMA PALLIDUM IGG+IGM AB [PRESENCE] IN SERUM OR PLASMA BY IMMUNOASSAY: NORMAL

## 2024-12-05 NOTE — ED PROVIDER NOTES
Time reflects when diagnosis was documented in both MDM as applicable and the Disposition within this note       Time User Action Codes Description Comment    12/4/2024  6:43 PM Selma Marco RADHA Add [Z20.2] Exposure to STD           ED Disposition       ED Disposition   Discharge    Condition   Stable    Date/Time   Wed Dec 4, 2024  6:43 PM    Comment   Sandra Milner discharge to home/self care.                   Assessment & Plan       Medical Decision Making  This is a 34-year-old female presents to the emergency department requesting STD treatment.  I considered STI, gonorrhea, chlamydia, HIV, syphilis. These and other diagnoses were considered.     The patient was well-appearing on exam.  She is not tachycardic or febrile.  Patient feels she was exposed to somebody with an STI.  She will be treated for gonorrhea and chlamydia.  She will be tested for gonorrhea, chlamydia, HIV, and syphilis.  She was discharged with follow-up to her primary care physician.    Amount and/or Complexity of Data Reviewed  Labs: ordered.    Risk  Prescription drug management.             Medications   cefTRIAXone (ROCEPHIN) 500 mg in lidocaine (PF) (XYLOCAINE-MPF) 1 % IM only syringe (500 mg Intramuscular Given 12/4/24 1914)   doxycycline hyclate (VIBRAMYCIN) capsule 100 mg (100 mg Oral Given 12/4/24 1914)       ED Risk Strat Scores                                               History of Present Illness       Chief Complaint   Patient presents with    Exposure to STD     Pt reports possible exposure to std. Pt reports dysuria, and fishy odor.        Past Medical History:   Diagnosis Date    Chlamydia 2008    Disease of thyroid gland     Vitamin D deficiency       Past Surgical History:   Procedure Laterality Date    [place] LAPAROSCOPY W/RMVL ADNEXAL STRUCTURES Bilateral 9/5/2017    Procedure: LAPAROSCOPIC SALPINGECOTMY;  Surgeon: Antonio Salguero MD;  Location: [facility];  Service: Gynecology      Family History   Problem Relation  Age of Onset    Hypertension Mother     Multiple sclerosis Mother     Hypothyroidism Mother     HIV Mother     Diabetes Father     No Known Problems Sister     No Known Problems Sister     No Known Problems Daughter     No Known Problems Son     No Known Problems Son     Autism Son     No Known Problems Son     Breast cancer Maternal Grandmother     Diabetes Paternal Grandmother     Colon cancer Paternal Grandfather     Ovarian cancer Neg Hx       Social History     Tobacco Use    Smoking status: Every Day     Current packs/day: 0.50     Average packs/day: 0.5 packs/day for 15.0 years (7.5 ttl pk-yrs)     Types: Cigarettes    Smokeless tobacco: Never   Vaping Use    Vaping status: Never Used   Substance Use Topics    Alcohol use: Not Currently    Drug use: Yes     Types: Marijuana     Comment: daily      E-Cigarette/Vaping    E-Cigarette Use Never User       E-Cigarette/Vaping Substances    Nicotine No     THC No     CBD No     Flavoring No       I have reviewed and agree with the history as documented.     34-year-old female presents to the emergency department requesting she has been exposed to an STD.  The patient states that her and her boyfriend have been exposed to someone who tested positive for chlamydia.  She states that she has had mild burning and a foul odor from her vagina.  She denies urinary symptoms.  She denies fevers or chills.  She denies nausea or vomiting.  She denies a rash.        Review of Systems   All other systems reviewed and are negative.          Objective       ED Triage Vitals [12/04/24 1820]   Temperature Pulse Blood Pressure Respirations SpO2 Patient Position - Orthostatic VS   98.1 °F (36.7 °C) 78 (!) 163/102 18 97 % Sitting      Temp Source Heart Rate Source BP Location FiO2 (%) Pain Score    Oral Monitor Right arm -- --      Vitals      Date and Time Temp Pulse SpO2 Resp BP Pain Score FACES Pain Rating User   12/04/24 1820 98.1 °F (36.7 °C) 78 97 % 18 163/102 -- -- RN       "      Physical Exam  Constitutional: Vital signs reviewed, patient well-appearing, nontoxic  Eyes: Extraocular movements intact   HEENT: Trachea midline, no JVD, moist mucous membranes   Respiratory: Equal chest expansion   Cardiovascular: Well perfused   Abdomen: No distension   Extremities: No edema   Neuro: awake, alert, oriented, no focal weakness   Skin: warm, dry, intact, no rashes noted     Results Reviewed       Procedure Component Value Units Date/Time    Chlamydia/GC amplified DNA by PCR [[id number]] Collected: 12/04/24 1913    Lab Status: In process Specimen: Urine, Other Updated: 12/04/24 1919    HIV 1/2 AG/AB w Reflex [facility] for 2 yr old and above [[id number]] Collected: 12/04/24 1914    Lab Status: In process Specimen: Blood from Arm, Right Updated: 12/04/24 1919    RPR-Syphilis Screening (Total Syphilis IGG/IGM) [[id number]] Collected: 12/04/24 1914    Lab Status: In process Specimen: Blood from Arm, Right Updated: 12/04/24 1919            No orders to display       Procedures    ED Medication and Procedure Management   Prior to Admission Medications   Prescriptions Last Dose Informant Patient Reported? Taking?   VITAMIN D, ERGOCALCIFEROL, PO  Self Yes No   Sig: Take 50,000 Int'l Units by mouth \"Almost every day\"   ferrous sulfate 325 (65 Fe) mg tablet  Self Yes No   Sig: Take 325 mg by mouth daily with breakfast \"Almost every day\"   topiramate (TOPAMAX) 25 mg tablet  Self Yes No   Sig: Take 25 mg by mouth 2 (two) times a day   Patient not taking: Reported on 8/12/2024      Facility-Administered Medications: None     Discharge Medication List as of 12/4/2024  6:45 PM        START taking these medications    Details   doxycycline hyclate (VIBRAMYCIN) 100 mg capsule Take 1 capsule (100 mg total) by mouth 2 (two) times a day for 7 days, Starting Wed 12/4/2024, Until Wed 12/11/2024, Normal           CONTINUE these medications which have NOT CHANGED    Details   ferrous sulfate 325 (65 Fe) mg tablet Take 325 " "mg by mouth daily with breakfast \"Almost every day\", Historical Med      topiramate (TOPAMAX) 25 mg tablet Take 25 mg by mouth 2 (two) times a day, Starting Mon 7/22/2024, Historical Med      VITAMIN D, ERGOCALCIFEROL, PO Take 50,000 Int'l Units by mouth \"Almost every day\", Historical Med           No discharge procedures on file.  ED SEPSIS DOCUMENTATION   Time reflects when diagnosis was documented in both MDM as applicable and the Disposition within this note       Time User Action Codes Description Comment    12/4/2024  6:43 PM Marco Hahn Add [Z20.2] Exposure to STD                  Marco Hahn, DO  12/04/24 2103    "

## 2024-12-06 ENCOUNTER — OFFICE VISIT (OUTPATIENT)
Dept: OBGYN CLINIC | Facility: CLINIC | Age: 34
End: 2024-12-06

## 2024-12-06 VITALS
DIASTOLIC BLOOD PRESSURE: 84 MMHG | HEART RATE: 93 BPM | WEIGHT: 236 LBS | HEIGHT: 67 IN | RESPIRATION RATE: 18 BRPM | SYSTOLIC BLOOD PRESSURE: 138 MMHG | BODY MASS INDEX: 37.04 KG/M2

## 2024-12-06 DIAGNOSIS — N89.8 VAGINAL DISCHARGE: Primary | ICD-10-CM

## 2024-12-06 PROCEDURE — 99213 OFFICE O/P EST LOW 20 MIN: CPT | Performed by: OBSTETRICS & GYNECOLOGY

## 2024-12-06 NOTE — PROGRESS NOTES
"Name: Sandra Milner      : 1990      MRN: [id number]  Encounter Provider: Resident DEANGELO Martinez  Encounter Date: 2024   Encounter department: [facility]  :  Assessment & Plan  Vaginal discharge  Thin watery discharge accompanied by discomfort. GC, syphillis, HIV negative in the ED. pH normal in office today, microscopic examination showed no evidence of clue cells or trichomoniasis. No skin lesions present around the vagina. Symptoms noted to be slowly improving.     Plan  No indication for treatment at this time  Discussed monitoring symptoms closely, will follow up as needed if symptoms do not continue to improve or worsen.            History of Present Illness   HPI    Sandra Milner is a 34 y.o. female who presents for 2 weeks of thin watery vaginal discharge, odor, slight burning, and abdominal discomfort. She does note recently finding out that her partner of 7 years is cheating on her. She was seen in the ED  and tested negative for GC, syphillis, and HIV. She was given an empiric shot of ceftriaxone at that time, and discharged with doxycycline, which she is not taking due to negative results from the ED.     History obtained from: patient    Review of Systems   Constitutional:  Negative for chills and fever.   Genitourinary:  Positive for dysuria, pelvic pain (mild) and vaginal discharge (thin). Negative for genital sores.   Skin:  Negative for rash.     Medical History Reviewed by provider this encounter:     .     Objective   /84 (BP Location: Right arm, Patient Position: Sitting, Cuff Size: Large)   Pulse 93   Resp 18   Ht 5' 7\" (1.702 m)   Wt 107 kg (236 lb)   LMP 11/15/2024 (Approximate)   BMI 36.96 kg/m²      Physical Exam  Constitutional:       General: She is not in acute distress.     Appearance: She is not toxic-appearing.   Cardiovascular:      Rate and Rhythm: Normal rate.   Pulmonary:      Effort: Pulmonary effort is normal. "   Genitourinary:     General: Normal vulva.      Vagina: No vaginal discharge.      Comments: No genital lesions  Skin:     General: Skin is warm and dry.   Neurological:      Mental Status: She is alert and oriented to person, place, and time.   Psychiatric:         Mood and Affect: Mood normal.         Behavior: Behavior normal.         Thought Content: Thought content normal.         Judgment: Judgment normal.

## 2025-02-23 ENCOUNTER — HOSPITAL ENCOUNTER (EMERGENCY)
Facility: HOSPITAL | Age: 35
Discharge: HOME | End: 2025-02-23
Attending: EMERGENCY MEDICINE | Admitting: INTERNAL MEDICINE
Payer: COMMERCIAL

## 2025-02-23 VITALS
OXYGEN SATURATION: 99 % | HEART RATE: 81 BPM | RESPIRATION RATE: 16 BRPM | TEMPERATURE: 99.3 F | DIASTOLIC BLOOD PRESSURE: 58 MMHG | SYSTOLIC BLOOD PRESSURE: 117 MMHG

## 2025-02-23 DIAGNOSIS — J02.9 PHARYNGITIS, UNSPECIFIED ETIOLOGY: Primary | ICD-10-CM

## 2025-02-23 LAB — S PYO DNA THROAT QL NAA+PROBE: NOT DETECTED

## 2025-02-23 PROCEDURE — 99284 EMERGENCY DEPT VISIT MOD MDM: CPT | Performed by: PHYSICIAN ASSISTANT

## 2025-02-23 PROCEDURE — 99282 EMERGENCY DEPT VISIT SF MDM: CPT

## 2025-02-23 PROCEDURE — 87651 STREP A DNA AMP PROBE: CPT | Performed by: PHYSICIAN ASSISTANT

## 2025-02-23 RX ORDER — PREDNISONE 20 MG/1
60 TABLET ORAL ONCE
Status: COMPLETED | OUTPATIENT
Start: 2025-02-23 | End: 2025-02-23

## 2025-02-23 RX ORDER — ACETAMINOPHEN 325 MG/1
650 TABLET ORAL ONCE
Status: COMPLETED | OUTPATIENT
Start: 2025-02-23 | End: 2025-02-23

## 2025-02-23 RX ADMIN — ACETAMINOPHEN 650 MG: 325 TABLET, FILM COATED ORAL at 18:38

## 2025-02-23 RX ADMIN — PREDNISONE 60 MG: 20 TABLET ORAL at 18:38

## 2025-02-23 NOTE — ED PROVIDER NOTES
Time reflects when diagnosis was documented in both MDM as applicable and the Disposition within this note       Time User Action Codes Description Comment    2/23/2025  7:21 PM Roman Keisha Add [J02.9] Pharyngitis, unspecified etiology           ED Disposition       ED Disposition   Discharge    Condition   Stable    Date/Time   Sun Feb 23, 2025  7:21 PM    Comment   Sandra Alf discharge to home/self care.                   Assessment & Plan       Medical Decision Making  Pt with sore throat, normal tongue anatomy  Considered strep, viral illness, stomatitis - no ulcers seen  Strep neg, pt drinking without difficulty  Stable for DC, outpt FU with symptomatic tx      Amount and/or Complexity of Data Reviewed  Labs: ordered. Decision-making details documented in ED Course.    Risk  OTC drugs.  Prescription drug management.        ED Course as of 02/23/25 1940   Sun Feb 23, 2025   1917 STREP A PCR: Not Detected       Medications   acetaminophen (TYLENOL) tablet 650 mg (650 mg Oral Given 2/23/25 1838)   predniSONE tablet 60 mg (60 mg Oral Given 2/23/25 1838)       ED Risk Strat Scores                                                History of Present Illness       Chief Complaint   Patient presents with    Sore Throat     Pt reports sore throat, bumps on tongue x 2 days. Pt took motrin without relief       Past Medical History:   Diagnosis Date    Anemia 2020    Chlamydia 2008    Disease of thyroid gland     Vitamin D deficiency       Past Surgical History:   Procedure Laterality Date    WA LAPAROSCOPY W/RMVL ADNEXAL STRUCTURES Bilateral 9/5/2017    Procedure: LAPAROSCOPIC SALPINGECOTMY;  Surgeon: Antonio Salguero MD;  Location: [facility];  Service: Gynecology      Family History   Problem Relation Age of Onset    Hypertension Mother     Multiple sclerosis Mother     Hypothyroidism Mother     HIV Mother     Diabetes Father     No Known Problems Sister     No Known Problems Sister     No Known Problems  "Daughter     No Known Problems Son     No Known Problems Son     Autism Son     No Known Problems Son     Breast cancer Maternal Grandmother     Diabetes Paternal Grandmother     Colon cancer Paternal Grandfather     Ovarian cancer Neg Hx       Social History     Tobacco Use    Smoking status: Every Day     Current packs/day: 0.50     Average packs/day: 0.5 packs/day for 20.0 years (10.0 ttl pk-yrs)     Types: Cigarettes    Smokeless tobacco: Never   Vaping Use    Vaping status: Never Used   Substance Use Topics    Alcohol use: Not Currently    Drug use: Yes     Types: Marijuana     Comment: daily      E-Cigarette/Vaping    E-Cigarette Use Never User       E-Cigarette/Vaping Substances    Nicotine No     THC No     CBD No     Flavoring No       I have reviewed and agree with the history as documented.     PMH: Iron deficiency anemia, Irregular menses, Menorrhagia with irregular cycle,   PSH: LAPAROSCOPY W/RMVL B/L ADNEXAL STRUCTURES,     Pt presents  to ED c/o 2 day h/o sore throat, and \"bumps\" on the back of her tongue.  Pt took Ibuprofen about 4 hrs PTA without relief.  Pt dirnking coffee during exam  NO fever, chills, HA, ear pain, cp, sob, abd pain, NVD, rash        Review of Systems   Constitutional:  Negative for fever.   HENT:  Positive for congestion and sore throat. Negative for ear discharge, ear pain, facial swelling, nosebleeds, postnasal drip and trouble swallowing.    Respiratory:  Negative for shortness of breath.    Cardiovascular:  Negative for chest pain.   Gastrointestinal:  Negative for vomiting.   Skin:  Negative for rash.   Neurological:  Negative for headaches.   All other systems reviewed and are negative.          Objective       ED Triage Vitals   Temperature Pulse Blood Pressure Respirations SpO2 Patient Position - Orthostatic VS   02/23/25 1815 02/23/25 1815 02/23/25 1815 02/23/25 1814 02/23/25 1815 --   99.3 °F (37.4 °C) 81 117/58 16 99 %       Temp Source Heart Rate Source BP Location " FiO2 (%) Pain Score    02/23/25 1815 -- -- -- 02/23/25 1838    Oral    5      Vitals      Date and Time Temp Pulse SpO2 Resp BP Pain Score FACES Pain Rating User   02/23/25 1838 -- -- -- -- -- 5 -- JLT   02/23/25 1815 99.3 °F (37.4 °C) 81 99 % -- 117/58 -- -- BG   02/23/25 1814 -- -- -- 16 -- -- -- BG            Physical Exam  Vitals and nursing note reviewed.   Constitutional:       General: She is not in acute distress.     Appearance: She is well-developed. She is obese.   HENT:      Head: Normocephalic and atraumatic.      Right Ear: Tympanic membrane, ear canal and external ear normal.      Left Ear: Tympanic membrane, ear canal and external ear normal.      Nose: Nose normal.      Mouth/Throat:      Mouth: Mucous membranes are moist.      Pharynx: Oropharynx is clear. Uvula midline. Posterior oropharyngeal erythema present. No oropharyngeal exudate or uvula swelling (no shift, no signs of abscess).      Comments: Prominent Vallate noted to posterior tongue, which has coffee, colored coating from drinking coffee  Eyes:      Conjunctiva/sclera: Conjunctivae normal.   Cardiovascular:      Rate and Rhythm: Normal rate and regular rhythm.   Pulmonary:      Effort: Pulmonary effort is normal. No respiratory distress.      Breath sounds: Normal breath sounds.   Abdominal:      General: Bowel sounds are normal.      Palpations: Abdomen is soft.      Tenderness: There is no abdominal tenderness.   Musculoskeletal:         General: Normal range of motion.      Cervical back: Normal range of motion.   Lymphadenopathy:      Cervical: No cervical adenopathy.   Skin:     General: Skin is warm and dry.      Findings: No rash.   Neurological:      Mental Status: She is alert.   Psychiatric:         Behavior: Behavior normal.         Results Reviewed       Procedure Component Value Units Date/Time    Strep A PCR [[id number]]  (Normal) Collected: 02/23/25 1838    Lab Status: Final result Specimen: Throat Updated: 02/23/25 1906  "    STREP A PCR Not Detected            No orders to display       Procedures    ED Medication and Procedure Management   Prior to Admission Medications   Prescriptions Last Dose Informant Patient Reported? Taking?   VITAMIN D, ERGOCALCIFEROL, PO  Self Yes No   Sig: Take 50,000 Int'l Units by mouth \"Almost every day\"   ferrous sulfate 325 (65 Fe) mg tablet  Self Yes No   Sig: Take 325 mg by mouth daily with breakfast \"Almost every day\"   topiramate (TOPAMAX) 25 mg tablet  Self Yes No   Sig: Take 25 mg by mouth 2 (two) times a day   Patient not taking: Reported on 8/12/2024      Facility-Administered Medications: None     Discharge Medication List as of 2/23/2025  7:21 PM        CONTINUE these medications which have NOT CHANGED    Details   ferrous sulfate 325 (65 Fe) mg tablet Take 325 mg by mouth daily with breakfast \"Almost every day\", Historical Med      topiramate (TOPAMAX) 25 mg tablet Take 25 mg by mouth 2 (two) times a day, Starting Mon 7/22/2024, Historical Med      VITAMIN D, ERGOCALCIFEROL, PO Take 50,000 Int'l Units by mouth \"Almost every day\", Historical Med           No discharge procedures on file.  ED SEPSIS DOCUMENTATION   Time reflects when diagnosis was documented in both MDM as applicable and the Disposition within this note       Time User Action Codes Description Comment    2/23/2025  7:21 PM Keisha Owens Add [J02.9] Pharyngitis, unspecified etiology                  Keisha Owens PA-C  02/23/25 1940    "

## 2025-02-24 NOTE — ED NOTES
Discharge reviewed with patient. Patient verbalized understanding and no further questions at this time. Patient ambulatory off unit with steady gait.      Jazmine Davis  02/23/25 1924

## 2025-02-24 NOTE — DISCHARGE INSTRUCTIONS
Use Tylenol every 4 hours or Motrin every 6 hours; you can alternate the 2 medications taking something every 3 hours for pain or fever.    Drink plenty of fluids    If no improvement follow-up with your doctor in next few days.

## (undated) DEVICE — INTENDED FOR TISSUE SEPARATION, AND OTHER PROCEDURES THAT REQUIRE A SHARP SURGICAL BLADE TO PUNCTURE OR CUT.: Brand: BARD-PARKER SAFETY BLADES SIZE 11, STERILE

## (undated) DEVICE — 3000CC GUARDIAN II: Brand: GUARDIAN

## (undated) DEVICE — ENDOPATH XCEL BLADELESS TROCARS WITH STABILITY SLEEVES: Brand: ENDOPATH XCEL

## (undated) DEVICE — ADHESIVE SKN CLSR HISTOACRYL FLEX 0.5ML LF

## (undated) DEVICE — STRL COTTON TIP APPLCTR 6IN PK: Brand: CARDINAL HEALTH

## (undated) DEVICE — GLOVE INDICATOR PI UNDERGLOVE SZ 8 BLUE

## (undated) DEVICE — ENDOPATH XCEL UNIVERSAL TROCAR STABLILITY SLEEVES: Brand: ENDOPATH XCEL

## (undated) DEVICE — CHLORHEXIDINE 4PCT 4 OZ

## (undated) DEVICE — STERILE LAP LITHOTOMY PACK: Brand: CARDINAL HEALTH

## (undated) DEVICE — GLOVE SRG BIOGEL ECLIPSE 7.5

## (undated) DEVICE — 3M™ STERI-STRIP™ REINFORCED ADHESIVE SKIN CLOSURES, R1547, 1/2 IN X 4 IN (12 MM X 100 MM), 6 STRIPS/ENVELOPE: Brand: 3M™ STERI-STRIP™

## (undated) DEVICE — IRRIG ENDO FLO TUBING

## (undated) DEVICE — CHLORAPREP HI-LITE 26ML ORANGE

## (undated) DEVICE — INSUFLATION TUBING INSUFLOW (LEXION)

## (undated) DEVICE — TRAY FOLEY 16FR URIMETER SURESTEP

## (undated) DEVICE — ALL PURPOSE SPONGES,NONWOVEN, 4 PLY: Brand: CURITY

## (undated) DEVICE — SYRINGE 10ML LL

## (undated) DEVICE — ANTI-FOG SOLUTION WITH FOAM PAD: Brand: DEVON

## (undated) DEVICE — SUT MONOCRYL 4-0 PS-2 18 IN Y496G

## (undated) DEVICE — NEEDLE 25GA X 1 IN SAFETY GLIDE